# Patient Record
Sex: MALE | Race: WHITE | HISPANIC OR LATINO | Employment: OTHER | URBAN - METROPOLITAN AREA
[De-identification: names, ages, dates, MRNs, and addresses within clinical notes are randomized per-mention and may not be internally consistent; named-entity substitution may affect disease eponyms.]

---

## 2017-02-09 ENCOUNTER — OFFICE VISIT (OUTPATIENT)
Dept: FAMILY MEDICINE | Facility: CLINIC | Age: 62
End: 2017-02-09
Payer: MEDICAID

## 2017-02-09 VITALS
SYSTOLIC BLOOD PRESSURE: 142 MMHG | HEIGHT: 62 IN | HEART RATE: 68 BPM | BODY MASS INDEX: 31.73 KG/M2 | TEMPERATURE: 97.7 F | WEIGHT: 172.4 LBS | DIASTOLIC BLOOD PRESSURE: 82 MMHG

## 2017-02-09 DIAGNOSIS — Z12.5 SCREENING FOR PROSTATE CANCER: ICD-10-CM

## 2017-02-09 DIAGNOSIS — R15.9 FECAL INCONTINENCE: ICD-10-CM

## 2017-02-09 DIAGNOSIS — I10 BENIGN ESSENTIAL HYPERTENSION: ICD-10-CM

## 2017-02-09 DIAGNOSIS — Z11.59 NEED FOR HEPATITIS C SCREENING TEST: Primary | ICD-10-CM

## 2017-02-09 DIAGNOSIS — D64.9 ANEMIA, UNSPECIFIED TYPE: ICD-10-CM

## 2017-02-09 DIAGNOSIS — Z00.00 ROUTINE GENERAL MEDICAL EXAMINATION AT A HEALTH CARE FACILITY: ICD-10-CM

## 2017-02-09 DIAGNOSIS — Z12.11 COLON CANCER SCREENING: ICD-10-CM

## 2017-02-09 DIAGNOSIS — Z13.220 LIPID SCREENING: ICD-10-CM

## 2017-02-09 PROBLEM — I48.0 PAF (PAROXYSMAL ATRIAL FIBRILLATION) (H): Status: ACTIVE | Noted: 2017-02-09

## 2017-02-09 LAB
ALBUMIN SERPL-MCNC: 3.9 G/DL (ref 3.4–5)
ALP SERPL-CCNC: 53 U/L (ref 40–150)
ALT SERPL W P-5'-P-CCNC: 23 U/L (ref 0–70)
ANION GAP SERPL CALCULATED.3IONS-SCNC: 6 MMOL/L (ref 3–14)
AST SERPL W P-5'-P-CCNC: 16 U/L (ref 0–45)
BASOPHILS # BLD AUTO: 0 10E9/L (ref 0–0.2)
BASOPHILS NFR BLD AUTO: 0.4 %
BILIRUB SERPL-MCNC: 0.3 MG/DL (ref 0.2–1.3)
BUN SERPL-MCNC: 18 MG/DL (ref 7–30)
CALCIUM SERPL-MCNC: 8.6 MG/DL (ref 8.5–10.1)
CHLORIDE SERPL-SCNC: 105 MMOL/L (ref 94–109)
CO2 SERPL-SCNC: 27 MMOL/L (ref 20–32)
CREAT SERPL-MCNC: 0.98 MG/DL (ref 0.66–1.25)
DIFFERENTIAL METHOD BLD: NORMAL
EOSINOPHIL # BLD AUTO: 0.2 10E9/L (ref 0–0.7)
EOSINOPHIL NFR BLD AUTO: 2.4 %
ERYTHROCYTE [DISTWIDTH] IN BLOOD BY AUTOMATED COUNT: 12.7 % (ref 10–15)
GFR SERPL CREATININE-BSD FRML MDRD: 77 ML/MIN/1.7M2
GLUCOSE SERPL-MCNC: 84 MG/DL (ref 70–99)
HCT VFR BLD AUTO: 42.7 % (ref 40–53)
HGB BLD-MCNC: 14.3 G/DL (ref 13.3–17.7)
LYMPHOCYTES # BLD AUTO: 2.3 10E9/L (ref 0.8–5.3)
LYMPHOCYTES NFR BLD AUTO: 31.9 %
MCH RBC QN AUTO: 31.4 PG (ref 26.5–33)
MCHC RBC AUTO-ENTMCNC: 33.5 G/DL (ref 31.5–36.5)
MCV RBC AUTO: 94 FL (ref 78–100)
MONOCYTES # BLD AUTO: 0.5 10E9/L (ref 0–1.3)
MONOCYTES NFR BLD AUTO: 7.5 %
NEUTROPHILS # BLD AUTO: 4.1 10E9/L (ref 1.6–8.3)
NEUTROPHILS NFR BLD AUTO: 57.8 %
PLATELET # BLD AUTO: 264 10E9/L (ref 150–450)
POTASSIUM SERPL-SCNC: 3.6 MMOL/L (ref 3.4–5.3)
PROT SERPL-MCNC: 7.4 G/DL (ref 6.8–8.8)
RBC # BLD AUTO: 4.56 10E12/L (ref 4.4–5.9)
SODIUM SERPL-SCNC: 138 MMOL/L (ref 133–144)
WBC # BLD AUTO: 7.1 10E9/L (ref 4–11)

## 2017-02-09 PROCEDURE — 80053 COMPREHEN METABOLIC PANEL: CPT | Performed by: NURSE PRACTITIONER

## 2017-02-09 PROCEDURE — 86803 HEPATITIS C AB TEST: CPT | Performed by: NURSE PRACTITIONER

## 2017-02-09 PROCEDURE — 36415 COLL VENOUS BLD VENIPUNCTURE: CPT | Performed by: NURSE PRACTITIONER

## 2017-02-09 PROCEDURE — 99213 OFFICE O/P EST LOW 20 MIN: CPT | Mod: 25 | Performed by: NURSE PRACTITIONER

## 2017-02-09 PROCEDURE — 85025 COMPLETE CBC W/AUTO DIFF WBC: CPT | Performed by: NURSE PRACTITIONER

## 2017-02-09 PROCEDURE — 99386 PREV VISIT NEW AGE 40-64: CPT | Performed by: NURSE PRACTITIONER

## 2017-02-09 RX ORDER — HYDROCHLOROTHIAZIDE 25 MG/1
25 TABLET ORAL DAILY
Qty: 90 TABLET | Refills: 0 | Status: SHIPPED | OUTPATIENT
Start: 2017-02-09 | End: 2017-06-12

## 2017-02-09 ASSESSMENT — ENCOUNTER SYMPTOMS
FATIGUE: 0
BLOOD IN STOOL: 1
COUGH: 0
DIARRHEA: 1
WHEEZING: 0
CONSTIPATION: 0
LIGHT-HEADEDNESS: 0
PALPITATIONS: 0
NUMBNESS: 1
ARTHRALGIAS: 1
FREQUENCY: 0
SORE THROAT: 0
SINUS PRESSURE: 0
JOINT SWELLING: 0
BACK PAIN: 1
ABDOMINAL PAIN: 0
RHINORRHEA: 0
SHORTNESS OF BREATH: 0
DIZZINESS: 0
DYSURIA: 0
HEADACHES: 1

## 2017-02-09 NOTE — LETTER
[facility]  [address] [address]  Phone: [phone]    February 14, 2017    Odilon Perea  [address] [address]              Dear Huey Perea,    Your blood test for Hepatitis C was inconclusive. Please return to the clinic for a lab appointment and will redraw this.  If you have any further questions please call the clinic at [phone].        Sincerely,      Laila VIVEROS / [facility]

## 2017-02-09 NOTE — MR AVS SNAPSHOT
After Visit Summary   2/9/2017    Odilon Perea    MRN: [id number]           Patient Information     Date Of Birth          1955        Visit Information        Provider Department      2/9/2017 1:30 PM Laila Back APRN CNP; [place] [employer] [facility]        Today's Diagnoses     Need for hepatitis C screening test    -  1     Colon cancer screening         Lipid screening         Screening         Routine general medical examination at a health care facility         Benign essential hypertension         Anemia, unspecified type         Screening for prostate cancer           Care Instructions    Make return appointment for fasting labs when you have not had anything to eat for 8 hours prior and the only thing to drink is water    Stop taking vasdecom, westepiron, Acetilsalicilico, sulfato ferroso, acido folico, hioscina, navaxen    You can continue to take the hidroclorotiazida, paracetamol and the nibezvag.       Preventive Health Recommendations  Male Ages 50 - 64    Yearly exam:             See your health care provider every year in order to  o   Review health changes.   o   Discuss preventive care.    o   Review your medicines if your doctor has prescribed any.     Have a cholesterol test every 5 years, or more frequently if you are at risk for high cholesterol/heart disease.     Have a diabetes test (fasting glucose) every three years. If you are at risk for diabetes, you should have this test more often.     Have a colonoscopy at age 50, or have a yearly FIT test (stool test). These exams will check for colon cancer.      Talk with your health care provider about whether or not a prostate cancer screening test (PSA) is right for you.    You should be tested each year for STDs (sexually transmitted diseases), if you re at risk.     Shots: Get a flu shot each year. Get a tetanus shot every 10 years.     Nutrition:    Eat at least 5 servings of  fruits and vegetables daily.     Eat whole-grain bread, whole-wheat pasta and brown rice instead of white grains and rice.     Talk to your provider about Calcium and Vitamin D.     Lifestyle    Exercise for at least 150 minutes a week (30 minutes a day, 5 days a week). This will help you control your weight and prevent disease.     Limit alcohol to one drink per day.     No smoking.     Wear sunscreen to prevent skin cancer.     See your dentist every six months for an exam and cleaning.     See your eye doctor every 1 to 2 years.            Follow-ups after your visit        Additional Services     GASTROENTEROLOGY ADULT REF PROCEDURE ONLY       Last Lab Result: No results found for: CR  There is no height or weight on file to calculate BMI.     Needed:  Yes  Language:  Hungarian    Patient will be contacted to schedule procedure.     Please be aware that coverage of these services is subject to the terms and limitations of your health insurance plan.  Call member services at your health plan with any benefit or coverage questions.  Any procedures must be performed at a [facility] facility OR coordinated by your clinic's referral office.    Please bring the following with you to your appointment:    (1) Any X-Rays, CTs or MRIs which have been performed.  Contact the facility where they were done to arrange for  prior to your scheduled appointment.    (2) List of current medications   (3) This referral request   (4) Any documents/labs given to you for this referral                  Future tests that were ordered for you today     Open Future Orders        Priority Expected Expires Ordered    Prostate spec antigen screen Routine  2/9/2018 2/9/2017    TSH with free T4 reflex Routine  2/9/2018 2/9/2017    Iron Routine  2/9/2018 2/9/2017    Iron and iron binding capacity Routine  2/9/2018 2/9/2017    Albumin Random Urine Quantitative Routine  2/9/2018 2/9/2017    UA reflex to Microscopic and Culture  "Routine  2018    Lipid panel reflex to direct LDL Routine  2018    Folate Routine  2018            Who to contact     Normal or non-critical lab and imaging results will be communicated to you by tab ticketbrokerhart, letter or phone within 4 business days after the clinic has received the results. If you do not hear from us within 7 days, please contact the clinic through MyChart or phone. If you have a critical or abnormal lab result, we will notify you by phone as soon as possible.  Submit refill requests through [employer] or call your pharmacy and they will forward the refill request to us. Please allow 3 business days for your refill to be completed.          If you need to speak with a  for additional information , please call: [phone]           Additional Information About Your Visit        [employer] Information     [employer]ji lets you send messages to your doctor, view your test results, renew your prescriptions, schedule appointments and more. To sign up, go to [email or web address] . Click on \"Log in\" on the left side of the screen, which will take you to the Welcome page. Then click on \"Sign up Now\" on the right side of the page.     You will be asked to enter the access code listed below, as well as some personal information. Please follow the directions to create your username and password.     Your access code is: [email or web address]  Expires: 5/10/2017  2:51 PM     Your access code will  in 90 days. If you need help or a new code, please call your [place] clinic or [phone].        Care EveryWhere ID     This is your Care EveryWhere ID. This could be used by other organizations to access your [facility] [place] medical records  [id number]        Your Vitals Were     Pulse Temperature Height BMI (Body Mass Index)          68 97.7  F (36.5  C) (Tympanic) 5' 2.25\" (1.581 m) 31.29 kg/m2         Blood Pressure from Last 3 Encounters:   17 142/82    Weight from " Last 3 Encounters:   02/09/17 172 lb 6.4 oz (78.2 kg)              We Performed the Following     CBC with platelets differential     Comprehensive metabolic panel     GASTROENTEROLOGY ADULT REF PROCEDURE ONLY     Hepatitis C antibody        Primary Care Provider    None Specified       No primary provider on file.        Thank you!     Thank you for choosing [facility]  for your care. Our goal is always to provide you with excellent care. Hearing back from our patients is one way we can continue to improve our services. Please take a few minutes to complete the written survey that you may receive in the mail after your visit with us. Thank you!             Your Updated Medication List - Protect others around you: Learn how to safely use, store and throw away your medicines at www.disposemymeds.org.          This list is accurate as of: 2/9/17  2:51 PM.  Always use your most recent med list.                   Brand Name Dispense Instructions for use    IRBESARTAN PO      Take 300 mg by mouth       OMEPRAZOLE PO      Take 20 mg by mouth       * UNABLE TO FIND      MEDICATION NAME:  Westepiron 500 mg       * UNABLE TO FIND      MEDICATION NAME: Vasdecom 120 mg       * UNABLE TO FIND      MEDICATION NAME: Acido Acetilsalicilico 500 mg       * UNABLE TO FIND      MEDICATION NAME: sulfato ferroso       * UNABLE TO FIND      MEDICATION NAME: acido folico 0.4 mg       * UNABLE TO FIND      MEDICATION NAME: Salbutamol 100 ug       * UNABLE TO FIND      MEDICATION NAME: hioscina 10 mg       * UNABLE TO FIND      MEDICATION NAME: novaxen 250 mg       * UNABLE TO FIND      MEDICATION NAME: hidroclorotiazida 25 mg       * UNABLE TO FIND      MEDICATION NAME: nibezvag 200 mg       * UNABLE TO FIND      MEDICATION NAME: paracetamol 500 mg       * Notice:  This list has 11 medication(s) that are the same as other medications prescribed for you. Read the directions carefully, and ask your doctor or other care  provider to review them with you.

## 2017-02-09 NOTE — PATIENT INSTRUCTIONS
Make return appointment for fasting labs when you have not had anything to eat for 8 hours prior and the only thing to drink is water    Stop taking vasdecom, westepiron, Acetilsalicilico, sulfato ferroso, acido folico, hioscina, navaxen    You can continue to take the hidroclorotiazida, paracetamol and the nibezvag.       Preventive Health Recommendations  Male Ages 50   64    Yearly exam:             See your health care provider every year in order to  o   Review health changes.   o   Discuss preventive care.    o   Review your medicines if your doctor has prescribed any.     Have a cholesterol test every 5 years, or more frequently if you are at risk for high cholesterol/heart disease.     Have a diabetes test (fasting glucose) every three years. If you are at risk for diabetes, you should have this test more often.     Have a colonoscopy at age 50, or have a yearly FIT test (stool test). These exams will check for colon cancer.      Talk with your health care provider about whether or not a prostate cancer screening test (PSA) is right for you.    You should be tested each year for STDs (sexually transmitted diseases), if you re at risk.     Shots: Get a flu shot each year. Get a tetanus shot every 10 years.     Nutrition:    Eat at least 5 servings of fruits and vegetables daily.     Eat whole-grain bread, whole-wheat pasta and brown rice instead of white grains and rice.     Talk to your provider about Calcium and Vitamin D.     Lifestyle    Exercise for at least 150 minutes a week (30 minutes a day, 5 days a week). This will help you control your weight and prevent disease.     Limit alcohol to one drink per day.     No smoking.     Wear sunscreen to prevent skin cancer.     See your dentist every six months for an exam and cleaning.     See your eye doctor every 1 to 2 years.
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
## 2017-02-09 NOTE — NURSING NOTE
"Chief Complaint   Patient presents with     Physical       Initial /82 mmHg  Pulse 68  Temp(Src) 97.7  F (36.5  C) (Tympanic)  Ht 5' 2.25\" (1.581 m)  Wt 172 lb 6.4 oz (78.2 kg)  BMI 31.29 kg/m2 Estimated body mass index is 31.29 kg/(m^2) as calculated from the following:    Height as of this encounter: 5' 2.25\" (1.581 m).    Weight as of this encounter: 172 lb 6.4 oz (78.2 kg).  Medication Reconciliation: complete    "

## 2017-02-10 DIAGNOSIS — Z11.59 NEED FOR HEPATITIS C SCREENING TEST: Primary | ICD-10-CM

## 2017-02-10 LAB — HCV AB SERPL QL IA: ABNORMAL

## 2017-02-14 ENCOUNTER — HOSPITAL (OUTPATIENT)
Dept: OTHER | Age: 62
End: 2017-02-14
Attending: INTERNAL MEDICINE

## 2017-02-16 ENCOUNTER — ALLIED HEALTH/NURSE VISIT (OUTPATIENT)
Dept: FAMILY MEDICINE | Facility: CLINIC | Age: 62
End: 2017-02-16
Payer: MEDICAID

## 2017-02-16 VITALS — SYSTOLIC BLOOD PRESSURE: 124 MMHG | DIASTOLIC BLOOD PRESSURE: 86 MMHG | HEART RATE: 72 BPM

## 2017-02-16 DIAGNOSIS — Z00.00 ROUTINE GENERAL MEDICAL EXAMINATION AT A HEALTH CARE FACILITY: ICD-10-CM

## 2017-02-16 DIAGNOSIS — I10 BENIGN ESSENTIAL HYPERTENSION: ICD-10-CM

## 2017-02-16 DIAGNOSIS — Z12.5 SCREENING FOR PROSTATE CANCER: ICD-10-CM

## 2017-02-16 DIAGNOSIS — Z11.59 NEED FOR HEPATITIS C SCREENING TEST: ICD-10-CM

## 2017-02-16 DIAGNOSIS — D64.9 ANEMIA, UNSPECIFIED TYPE: ICD-10-CM

## 2017-02-16 DIAGNOSIS — Z01.30 BP CHECK: Primary | ICD-10-CM

## 2017-02-16 DIAGNOSIS — M25.50 MULTIPLE JOINT PAIN: Primary | ICD-10-CM

## 2017-02-16 DIAGNOSIS — Z13.220 LIPID SCREENING: ICD-10-CM

## 2017-02-16 LAB
ALBUMIN UR-MCNC: NEGATIVE MG/DL
APPEARANCE UR: CLEAR
BILIRUB UR QL STRIP: NEGATIVE
CHOLEST SERPL-MCNC: 145 MG/DL
COLOR UR AUTO: YELLOW
CREAT UR-MCNC: 110 MG/DL
FOLATE SERPL-MCNC: 13.9 NG/ML
GLUCOSE UR STRIP-MCNC: NEGATIVE MG/DL
HDLC SERPL-MCNC: 39 MG/DL
HGB UR QL STRIP: ABNORMAL
IRON SATN MFR SERPL: 22 % (ref 15–46)
IRON SERPL-MCNC: 75 UG/DL (ref 35–180)
KETONES UR STRIP-MCNC: NEGATIVE MG/DL
LDLC SERPL CALC-MCNC: 81 MG/DL
LEUKOCYTE ESTERASE UR QL STRIP: NEGATIVE
MICROALBUMIN UR-MCNC: 5 MG/L
MICROALBUMIN/CREAT UR: 4.55 MG/G CR (ref 0–17)
NITRATE UR QL: NEGATIVE
NONHDLC SERPL-MCNC: 106 MG/DL
PH UR STRIP: 7.5 PH (ref 5–7)
PSA SERPL-ACNC: 1.91 UG/L (ref 0–4)
RBC #/AREA URNS AUTO: NORMAL /HPF (ref 0–2)
SP GR UR STRIP: 1.01 (ref 1–1.03)
TIBC SERPL-MCNC: 346 UG/DL (ref 240–430)
TRIGL SERPL-MCNC: 123 MG/DL
TSH SERPL DL<=0.005 MIU/L-ACNC: 1.68 MU/L (ref 0.4–4)
URN SPEC COLLECT METH UR: ABNORMAL
UROBILINOGEN UR STRIP-ACNC: 0.2 EU/DL (ref 0.2–1)
WBC #/AREA URNS AUTO: NORMAL /HPF (ref 0–2)

## 2017-02-16 PROCEDURE — 99207 ZZC NO CHARGE NURSE ONLY: CPT

## 2017-02-16 PROCEDURE — 82043 UR ALBUMIN QUANTITATIVE: CPT | Performed by: NURSE PRACTITIONER

## 2017-02-16 PROCEDURE — 84443 ASSAY THYROID STIM HORMONE: CPT | Performed by: NURSE PRACTITIONER

## 2017-02-16 PROCEDURE — 83540 ASSAY OF IRON: CPT | Performed by: NURSE PRACTITIONER

## 2017-02-16 PROCEDURE — 80061 LIPID PANEL: CPT | Performed by: NURSE PRACTITIONER

## 2017-02-16 PROCEDURE — 81001 URINALYSIS AUTO W/SCOPE: CPT | Performed by: NURSE PRACTITIONER

## 2017-02-16 PROCEDURE — 83550 IRON BINDING TEST: CPT | Performed by: NURSE PRACTITIONER

## 2017-02-16 PROCEDURE — G0103 PSA SCREENING: HCPCS | Performed by: NURSE PRACTITIONER

## 2017-02-16 PROCEDURE — 86803 HEPATITIS C AB TEST: CPT | Performed by: NURSE PRACTITIONER

## 2017-02-16 PROCEDURE — 82746 ASSAY OF FOLIC ACID SERUM: CPT | Performed by: NURSE PRACTITIONER

## 2017-02-16 PROCEDURE — 36415 COLL VENOUS BLD VENIPUNCTURE: CPT | Performed by: NURSE PRACTITIONER

## 2017-02-16 RX ORDER — NAPROXEN 500 MG/1
500 TABLET ORAL 2 TIMES DAILY WITH MEALS
Qty: 180 TABLET | Refills: 0 | Status: SHIPPED | OUTPATIENT
Start: 2017-02-16 | End: 2017-06-12

## 2017-02-16 NOTE — MR AVS SNAPSHOT
"              After Visit Summary   2017    Odilon Perea    MRN: [id number]           Patient Information     Date Of Birth          1955        Visit Information        Provider Department      2017 8:30 AM Norma/Miguel A Regan [facility]        Today's Diagnoses     BP check    -  1       Follow-ups after your visit        Who to contact     Normal or non-critical lab and imaging results will be communicated to you by MyChart, letter or phone within 4 business days after the clinic has received the results. If you do not hear from us within 7 days, please contact the clinic through MyChart or phone. If you have a critical or abnormal lab result, we will notify you by phone as soon as possible.  Submit refill requests through [employer] or call your pharmacy and they will forward the refill request to us. Please allow 3 business days for your refill to be completed.          If you need to speak with a  for additional information , please call: [phone]           Additional Information About Your Visit        [employer] Information     [employer] lets you send messages to your doctor, view your test results, renew your prescriptions, schedule appointments and more. To sign up, go to [email or web address] . Click on \"Log in\" on the left side of the screen, which will take you to the Welcome page. Then click on \"Sign up Now\" on the right side of the page.     You will be asked to enter the access code listed below, as well as some personal information. Please follow the directions to create your username and password.     Your access code is: [email or web address]  Expires: 5/10/2017  2:51 PM     Your access code will  in 90 days. If you need help or a new code, please call your [facility] clinic or [phone].        Care EveryWhere ID     This is your Care EveryWhere ID. This could be used by other organizations to access your [facility] medical records  [id number]        Your " Vitals Were     Pulse                   72            Blood Pressure from Last 3 Encounters:   02/16/17 124/86   02/09/17 142/82    Weight from Last 3 Encounters:   02/09/17 172 lb 6.4 oz (78.2 kg)              Today, you had the following     No orders found for display       Primary Care Provider Office Phone # Fax #    JACKELINE Diez [phone]       [facility] [address]   [place] [address]        Thank you!     Thank you for choosing [facility]  for your care. Our goal is always to provide you with excellent care. Hearing back from our patients is one way we can continue to improve our services. Please take a few minutes to complete the written survey that you may receive in the mail after your visit with us. Thank you!             Your Updated Medication List - Protect others around you: Learn how to safely use, store and throw away your medicines at www.disposemymeds.org.          This list is accurate as of: 2/16/17  8:35 AM.  Always use your most recent med list.                   Brand Name Dispense Instructions for use    hydrochlorothiazide 25 MG tablet    HYDRODIURIL    90 tablet    Take 1 tablet (25 mg) by mouth daily       IRBESARTAN PO      Take 300 mg by mouth       OMEPRAZOLE PO      Take 20 mg by mouth       * UNABLE TO FIND      MEDICATION NAME:  Westepiron 500 mg       * UNABLE TO FIND      MEDICATION NAME: Vasdecom 120 mg       * UNABLE TO FIND      MEDICATION NAME: Acido Acetilsalicilico 500 mg       * UNABLE TO FIND      MEDICATION NAME: sulfato ferroso       * UNABLE TO FIND      MEDICATION NAME: acido folico 0.4 mg       * UNABLE TO FIND      MEDICATION NAME: Salbutamol 100 ug       * UNABLE TO FIND      MEDICATION NAME: hioscina 10 mg       * UNABLE TO FIND      MEDICATION NAME: novaxen 250 mg       * UNABLE TO FIND      MEDICATION NAME: hidroclorotiazida 25 mg       * UNABLE TO FIND      MEDICATION NAME: nibezvag 200  mg       * UNABLE TO FIND      MEDICATION NAME: paracetamol 500 mg       * Notice:  This list has 11 medication(s) that are the same as other medications prescribed for you. Read the directions carefully, and ask your doctor or other care provider to review them with you.

## 2017-02-16 NOTE — PROGRESS NOTES
SUBJECTIVE:  Odilon Perea is a 62 year old male who presents for a follow up evaluation of his hypertension.    The reason for the visit is:  Headaches, nausea, and dizziness. His legs and hands are also swollen. He has been having hip and leg pain at night. He would like his blood pressure checked today.     Patient is taking medication as prescribed  Patient is tolerating medications well.  Patient is not monitoring Blood Pressure at home.  Average readings if yes are NA    Current complaints: headaches, nausea and dizziness, swelling in legs and hands. Hip and leg pain at night.      Current Outpatient Prescriptions   Medication     IRBESARTAN PO     UNABLE TO FIND     UNABLE TO FIND     UNABLE TO FIND     UNABLE TO FIND     UNABLE TO FIND     UNABLE TO FIND     UNABLE TO FIND     UNABLE TO FIND     UNABLE TO FIND     OMEPRAZOLE PO     UNABLE TO FIND     UNABLE TO FIND     hydrochlorothiazide (HYDRODIURIL) 25 MG tablet     No current facility-administered medications for this visit.        No Known Allergies      OBJECTIVE:  Please get a blood pressure AND a pulse.  A height is also needed if has not been done in the past year.    /86 (BP Location: Left arm, Patient Position: Chair, Cuff Size: Adult Regular)  Pulse 72    Vitals as recorded, a regular cuff was used.    ASSESSMENT:    Is the HYPERTENSION goal on the problem list? No  Patient Active Problem List   Diagnosis     PAF (paroxysmal atrial fibrillation) (H)       Plan:  The patient's blood pressure is less than documented goal. The patient will be discharged home. CC: this note to the patient's primary provider.     The patient s blood pressure is higher than goal but is less than 180 systolically AND less that 110 diastolically. The patient will be discharged home.  A telephone encounter will be created with this note and sent to the patient's primary provider for action.     The patient's blood pressure is above 180 systolically OR is above 110  diastolically. The primary provider, RN, or an available provider will be consulted for immediate action. Keep the patient here for appropriate urgent action.

## 2017-02-17 DIAGNOSIS — Z11.59 NEED FOR HEPATITIS C SCREENING TEST: Primary | ICD-10-CM

## 2017-02-17 LAB — HCV AB SERPL QL IA: ABNORMAL

## 2017-02-21 ENCOUNTER — MEDICAL CORRESPONDENCE (OUTPATIENT)
Dept: HEALTH INFORMATION MANAGEMENT | Facility: CLINIC | Age: 62
End: 2017-02-21

## 2017-02-23 ENCOUNTER — OFFICE VISIT (OUTPATIENT)
Dept: FAMILY MEDICINE | Facility: CLINIC | Age: 62
End: 2017-02-23
Payer: MEDICAID

## 2017-02-23 VITALS
TEMPERATURE: 98.1 F | HEART RATE: 77 BPM | SYSTOLIC BLOOD PRESSURE: 116 MMHG | BODY MASS INDEX: 30.7 KG/M2 | OXYGEN SATURATION: 99 % | WEIGHT: 169.2 LBS | DIASTOLIC BLOOD PRESSURE: 80 MMHG

## 2017-02-23 DIAGNOSIS — M16.10 ARTHRITIS OF HIP: ICD-10-CM

## 2017-02-23 DIAGNOSIS — R19.7 DIARRHEA, UNSPECIFIED TYPE: ICD-10-CM

## 2017-02-23 DIAGNOSIS — R15.9 BOWEL INCONTINENCE: ICD-10-CM

## 2017-02-23 DIAGNOSIS — I10 ESSENTIAL HYPERTENSION: Primary | ICD-10-CM

## 2017-02-23 DIAGNOSIS — Z11.59 NEED FOR HEPATITIS C SCREENING TEST: ICD-10-CM

## 2017-02-23 DIAGNOSIS — K21.00 GASTROESOPHAGEAL REFLUX DISEASE WITH ESOPHAGITIS: ICD-10-CM

## 2017-02-23 DIAGNOSIS — I48.0 PAF (PAROXYSMAL ATRIAL FIBRILLATION) (H): ICD-10-CM

## 2017-02-23 PROCEDURE — 36415 COLL VENOUS BLD VENIPUNCTURE: CPT | Performed by: NURSE PRACTITIONER

## 2017-02-23 PROCEDURE — 86803 HEPATITIS C AB TEST: CPT | Performed by: NURSE PRACTITIONER

## 2017-02-23 PROCEDURE — 99215 OFFICE O/P EST HI 40 MIN: CPT | Performed by: NURSE PRACTITIONER

## 2017-02-23 ASSESSMENT — ENCOUNTER SYMPTOMS
SORE THROAT: 0
DIZZINESS: 0
DIARRHEA: 1
LIGHT-HEADEDNESS: 0
SINUS PRESSURE: 0
WHEEZING: 0
JOINT SWELLING: 1
FREQUENCY: 0
SHORTNESS OF BREATH: 0
PALPITATIONS: 0
CONSTIPATION: 0
HEADACHES: 1
RHINORRHEA: 0
FATIGUE: 0
DYSURIA: 0
NUMBNESS: 0
ARTHRALGIAS: 1
COUGH: 0
ABDOMINAL PAIN: 0

## 2017-02-23 NOTE — NURSING NOTE
"Chief Complaint   Patient presents with     Rectal Problem     rectal leakage       Initial /80 (BP Location: Left arm, Patient Position: Chair, Cuff Size: Adult Regular)  Pulse 77  Temp 98.1  F (36.7  C) (Tympanic)  Wt 169 lb 3.2 oz (76.7 kg)  SpO2 99%  BMI 30.7 kg/m2 Estimated body mass index is 30.7 kg/(m^2) as calculated from the following:    Height as of 2/9/17: 5' 2.25\" (1.581 m).    Weight as of this encounter: 169 lb 3.2 oz (76.7 kg).  Medication Reconciliation: complete     April JED Simons      "
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
## 2017-02-23 NOTE — MR AVS SNAPSHOT
After Visit Summary   2/23/2017    Odilon Perea    MRN: [id number]           Patient Information     Date Of Birth          1955        Visit Information        Provider Department      2/23/2017 12:45 PM Laila Back APRN CNP; [employer] [employer] [facility]        Today's Diagnoses     Essential hypertension    -  1    PAF (paroxysmal atrial fibrillation) (H)        Gastroesophageal reflux disease with esophagitis        Diarrhea, unspecified type        Bowel incontinence           Follow-ups after your visit        Additional Services     CARDIOLOGY EVAL ADULT REFERRAL       Your provider has referred you to:  [facility] [phone]   [email or web address]    Please be aware that coverage of these services is subject to the terms and limitations of your health insurance plan.  Call member services at your health plan with any benefit or coverage questions.      Type of Referral:  New Cardiology Consult    Timeframe requested:  Within 1 month    Please bring the following to your appointment:  >>   Any x-rays, CTs or MRIs which have been performed.  Contact the facility where they were done to arrange for  prior to your scheduled appointment.    >>   List of current medications  >>   This referral request   >>   Any documents/labs given to you for this referral            GASTROENTEROLOGY ADULT REF PROCEDURE ONLY       Last Lab Result: Creatinine (mg/dL)       Date                     Value                 02/09/2017               0.98             ----------  Body mass index is 30.7 kg/(m^2).     Needed:  Yes  Language:  Norwegian    Patient will be contacted to schedule procedure.     Please be aware that coverage of these services is subject to the terms and limitations of your health insurance plan.  Call member services at your health plan with any  "benefit or coverage questions.  Any procedures must be performed at a [facility] facility OR coordinated by your clinic's referral office.    Please bring the following with you to your appointment:    (1) Any X-Rays, CTs or MRIs which have been performed.  Contact the facility where they were done to arrange for  prior to your scheduled appointment.    (2) List of current medications   (3) This referral request   (4) Any documents/labs given to you for this referral                  Who to contact     Normal or non-critical lab and imaging results will be communicated to you by [employer], letter or phone within 4 business days after the clinic has received the results. If you do not hear from us within 7 days, please contact the clinic through [employer] or phone. If you have a critical or abnormal lab result, we will notify you by phone as soon as possible.  Submit refill requests through [employer] or call your pharmacy and they will forward the refill request to us. Please allow 3 business days for your refill to be completed.          If you need to speak with a  for additional information , please call: [phone]           Additional Information About Your Visit        [employer] Information     [employer] lets you send messages to your doctor, view your test results, renew your prescriptions, schedule appointments and more. To sign up, go to [email or web address] . Click on \"Log in\" on the left side of the screen, which will take you to the Welcome page. Then click on \"Sign up Now\" on the right side of the page.     You will be asked to enter the access code listed below, as well as some personal information. Please follow the directions to create your username and password.     Your access code is: [email or web address]  Expires: 5/10/2017  2:51 PM     Your access code will  in 90 days. If you need help or a new code, please call your [facility] clinic or [phone].        Care EveryWhere ID     " This is your Care EveryWhere ID. This could be used by other organizations to access your [facility] medical records  [id number]        Your Vitals Were     Pulse Temperature Pulse Oximetry BMI (Body Mass Index)          77 98.1  F (36.7  C) (Tympanic) 99% 30.7 kg/m2         Blood Pressure from Last 3 Encounters:   02/23/17 116/80   02/16/17 124/86   02/09/17 142/82    Weight from Last 3 Encounters:   02/23/17 169 lb 3.2 oz (76.7 kg)   02/09/17 172 lb 6.4 oz (78.2 kg)              We Performed the Following     CARDIOLOGY EVAL ADULT REFERRAL     GASTROENTEROLOGY ADULT REF PROCEDURE ONLY          Today's Medication Changes          These changes are accurate as of: 2/23/17  1:42 PM.  If you have any questions, ask your nurse or doctor.               These medicines have changed or have updated prescriptions.        Dose/Directions    * OMEPRAZOLE PO   This may have changed:  Another medication with the same name was added. Make sure you understand how and when to take each.   Changed by:  Laila Back APRN CNP        Dose:  20 mg   Take 20 mg by mouth   Refills:  0       * omeprazole 20 MG CR capsule   Commonly known as:  priLOSEC   This may have changed:  You were already taking a medication with the same name, and this prescription was added. Make sure you understand how and when to take each.   Used for:  Gastroesophageal reflux disease with esophagitis   Changed by:  Laila Back APRN CNP        Dose:  20 mg   Take 1 capsule (20 mg) by mouth daily   Quantity:  90 capsule   Refills:  1       * Notice:  This list has 2 medication(s) that are the same as other medications prescribed for you. Read the directions carefully, and ask your doctor or other care provider to review them with you.         Where to get your medicines      These medications were sent to [facility] [address]  [address], [facility] [address]     Phone:  [phone]      omeprazole 20 MG CR capsule                Primary Care Provider Office Phone # Fax #    JACKELINE Diez [phone]       [facility] [address] [place] [address]        Thank you!     Thank you for choosing [facility]  for your care. Our goal is always to provide you with excellent care. Hearing back from our patients is one way we can continue to improve our services. Please take a few minutes to complete the written survey that you may receive in the mail after your visit with us. Thank you!             Your Updated Medication List - Protect others around you: Learn how to safely use, store and throw away your medicines at www.disposemymeds.org.          This list is accurate as of: 2/23/17  1:42 PM.  Always use your most recent med list.                   Brand Name Dispense Instructions for use    hydrochlorothiazide 25 MG tablet    HYDRODIURIL    90 tablet    Take 1 tablet (25 mg) by mouth daily       IRBESARTAN PO      Take 300 mg by mouth       naproxen 500 MG tablet    NAPROSYN    180 tablet    Take 1 tablet (500 mg) by mouth 2 times daily (with meals)       * OMEPRAZOLE PO      Take 20 mg by mouth       * omeprazole 20 MG CR capsule    priLOSEC    90 capsule    Take 1 capsule (20 mg) by mouth daily       * UNABLE TO FIND      MEDICATION NAME:  Westepiron 500 mg       * UNABLE TO FIND      MEDICATION NAME: Vasdecom 120 mg       * UNABLE TO FIND      MEDICATION NAME: Acido Acetilsalicilico 500 mg       * UNABLE TO FIND      MEDICATION NAME: sulfato ferroso       * UNABLE TO FIND      MEDICATION NAME: acido folico 0.4 mg       * UNABLE TO FIND      MEDICATION NAME: Salbutamol 100 ug       * UNABLE TO FIND      MEDICATION NAME: hioscina 10 mg       * UNABLE TO FIND      MEDICATION NAME: novaxen 250 mg       * UNABLE TO FIND      MEDICATION NAME: hidroclorotiazida 25 mg       * UNABLE TO FIND      MEDICATION NAME: nibezvag 200 mg       *  UNABLE TO FIND      MEDICATION NAME: paracetamol 500 mg       * Notice:  This list has 13 medication(s) that are the same as other medications prescribed for you. Read the directions carefully, and ask your doctor or other care provider to review them with you.

## 2017-02-24 LAB — HCV AB SERPL QL IA: ABNORMAL

## 2017-02-27 DIAGNOSIS — Z11.59 NEED FOR HEPATITIS C SCREENING TEST: Primary | ICD-10-CM

## 2017-03-06 DIAGNOSIS — Z11.59 NEED FOR HEPATITIS C SCREENING TEST: ICD-10-CM

## 2017-03-06 PROCEDURE — 36415 COLL VENOUS BLD VENIPUNCTURE: CPT | Performed by: NURSE PRACTITIONER

## 2017-03-06 PROCEDURE — 87522 HEPATITIS C REVRS TRNSCRPJ: CPT | Performed by: NURSE PRACTITIONER

## 2017-03-09 LAB
HCV RNA SERPL NAA+PROBE-ACNC: NORMAL [IU]/ML
HCV RNA SERPL NAA+PROBE-LOG IU: NORMAL LOG IU/ML

## 2017-03-14 ENCOUNTER — HOSPITAL ENCOUNTER (OUTPATIENT)
Dept: CARDIOLOGY | Facility: CLINIC | Age: 62
Discharge: HOME | End: 2017-03-14
Attending: INTERNAL MEDICINE | Admitting: INTERNAL MEDICINE
Payer: COMMERCIAL

## 2017-03-14 ENCOUNTER — OFFICE VISIT (OUTPATIENT)
Dept: CARDIOLOGY | Facility: CLINIC | Age: 62
End: 2017-03-14
Attending: NURSE PRACTITIONER
Payer: COMMERCIAL

## 2017-03-14 VITALS
BODY MASS INDEX: 31.35 KG/M2 | HEART RATE: 73 BPM | OXYGEN SATURATION: 96 % | SYSTOLIC BLOOD PRESSURE: 145 MMHG | DIASTOLIC BLOOD PRESSURE: 91 MMHG | WEIGHT: 172.8 LBS

## 2017-03-14 DIAGNOSIS — I48.0 PAROXYSMAL ATRIAL FIBRILLATION (H): Primary | ICD-10-CM

## 2017-03-14 DIAGNOSIS — I48.0 PAROXYSMAL ATRIAL FIBRILLATION (H): ICD-10-CM

## 2017-03-14 PROCEDURE — 93005 ELECTROCARDIOGRAM TRACING: CPT

## 2017-03-14 PROCEDURE — 99204 OFFICE O/P NEW MOD 45 MIN: CPT | Performed by: INTERNAL MEDICINE

## 2017-03-14 NOTE — PATIENT INSTRUCTIONS
Thank you for your [facility] visit today. Your provider has recommended the following:  Medication Changes:  None today. Continue taking your medications as you have been.  Recommendations:  EKG today  Zio patch next available  Echocardiogram next available  Follow-up:  See Dr. Hebert for cardiology follow up after in 1 month.  We kindly ask that you call cardiology scheduling at [phone] three months prior to requested revisit date to schedule future cardiology appointments.  Reminder:  1. Please bring in your current medication list or your medication, over the counter supplements and vitamin bottles as we will review these at each office visit.               [facility]  [facility]~[address]~[place], [place]~[address]  Questions about your visit today?  Call your Cardiology Clinic RN's-Shellie Milligan and/or Jolanta Paulson at [phone].

## 2017-03-14 NOTE — LETTER
3/14/2017    JACKELINE Diez [facility] [place]  [address] [place] [address]    RE: Odilon Perea       Dear Colleague,    I had the pleasure of seeing Odilon Perea in the [facility].    REASON FOR VISIT:  Evaluation of atrial fibrillation.      Mr. Perea is a pleasant 72-year-old gentleman with a history of hypertension and paroxysmal atrial fibrillation who is here for evaluation.      The patient is a very poor historian.  He only speaks Tongan and is originally from [place].  Apparently, in 2009 he was admitted in the hospital in [place].  Per patient, he underwent a stress test which apparently was abnormal and finally had a coronary angiography which was within normal limits.  He was told that he had an enlarged heart and also had problems with atrial fibrillation while in the hospital.      He is currently on therapy with HCTZ for blood pressure and not taking any other medications.      He was referred here for cardiovascular evaluation.  At the moment, he is doing well.  He informs me that there are moments that he feels lightheadedness.  He also describes atypical chest discomfort that are sporadic and happen at rest.  He denies any other symptoms such as syncope or near-syncopal episode or shortness of breath.      Of note,  his primary care physician has attempted to go obtain records from [place], however, this has been unsuccessful.     Outpatient Encounter Prescriptions as of 3/14/2017   Medication Sig Dispense Refill     omeprazole (PRILOSEC) 20 MG CR capsule Take 1 capsule (20 mg) by mouth daily 90 capsule 1     naproxen (NAPROSYN) 500 MG tablet Take 1 tablet (500 mg) by mouth 2 times daily (with meals) 180 tablet 0     hydrochlorothiazide (HYDRODIURIL) 25 MG tablet Take 1 tablet (25 mg) by mouth daily 90 tablet 0     No facility-administered encounter medications on file as of 3/14/2017.       ASSESSMENT AND PLAN:   1.   Cardiovascular evaluation.  It is unclear what exactly happened to him back in [place].  He seems to be in normal rhythm enriqueta.  I recommend obtaining an echocardiogram, Zio Patch monitor and EKG today.  He will follow up with me in a month to go over results.   2.  Hypertension.  Blood pressure is mildly elevated today.  He uses HCTZ.  We will continue current therapy for now.  We will readdress blood pressure therapy in a month.   3.  Embolic prevention.  He has a CHADS-VASc score of 1.  Unclear if he truly has atrial fibrillation as I have no documentation of Afib.  I will continue baby aspirin for now.     Again, thank you for allowing me to participate in the care of your patient.      Sincerely,    Misha Hebert MD     [facility]

## 2017-03-14 NOTE — PROGRESS NOTES
HPI and Plan:   See dictation  [id number]    Physical Exam:  Vitals: BP (!) 145/91 (BP Location: Right arm, Patient Position: Chair, Cuff Size: Adult Regular)  Pulse 73  Wt 78.4 kg (172 lb 12.8 oz)  SpO2 96%  BMI 31.35 kg/m2    Constitutional:  AAO x3.  Pt is in NAD.  HEAD: normocephalic.  SKIN: Skin normal color, texture and turgor with no lesions or eruptions.  Eyes: PERRL, EOMI.  ENT:  Supple, normal JVP. No lymphadenopathy or thyroid enlargement.  Chest:  CTAB.  Cardiac:  RRR, normal  S1 and S2.  No murmurs rubs or gallop.    Abdomen:  Normal BS.  Soft, non-tender and non-distended.  No rebound or guarding.    Extremities:  Pedious pulses palpable B/L.  No LE edema noticed.   Neurological: Strength and sensation grossly symmetric and intact throughout.       CURRENT MEDICATIONS:  Current Outpatient Prescriptions   Medication Sig Dispense Refill     omeprazole (PRILOSEC) 20 MG CR capsule Take 1 capsule (20 mg) by mouth daily 90 capsule 1     naproxen (NAPROSYN) 500 MG tablet Take 1 tablet (500 mg) by mouth 2 times daily (with meals) 180 tablet 0     hydrochlorothiazide (HYDRODIURIL) 25 MG tablet Take 1 tablet (25 mg) by mouth daily 90 tablet 0       ALLERGIES   No Known Allergies    PAST MEDICAL HISTORY:  No past medical history on file.    PAST SURGICAL HISTORY:  Past Surgical History   Procedure Laterality Date     Colonoscopy       Angiogram       unsure of results       FAMILY HISTORY:  Family History   Problem Relation Age of Onset     Ovarian Cancer Mother      Alzheimer Disease Father      DIABETES Brother      3 brothers with diabetes     Other - See Comments Sister      has an illness unsure what it is.        SOCIAL HISTORY:  Social History     Social History     Marital status: Single     Spouse name: N/A     Number of children: N/A     Years of education: N/A     Social History Main Topics     Smoking status: Former Smoker     Smokeless tobacco: None     Alcohol use No     Drug use: No     Sexual  activity: Yes     Partners: Male     Other Topics Concern     Parent/Sibling W/ Cabg, Mi Or Angioplasty Before 65f 55m? No     Social History Narrative       Review of Systems:  Skin:  Positive for bruising;scaling   Eyes:  Positive for visual blurring  ENT:  Positive for hearing loss;tinnitus  Respiratory:  Positive for dyspnea on exertion;cough  Cardiovascular:    Positive for;palpitations;chest pain;edema;fatigue;dizziness  Gastroenterology: Positive for reflux;excessive gas or bloating  Genitourinary:  Positive for urgency;urinary frequency  Musculoskeletal:  Positive for arthritis  Neurologic:  Positive for headaches;numbness or tingling of hands;tremors  Psychiatric:  Positive for depression  Heme/Lymph/Imm:  Negative    Endocrine:  Negative        Recent Lab Results:  LIPID RESULTS:  Lab Results   Component Value Date    CHOL 145 02/16/2017    HDL 39 (L) 02/16/2017    LDL 81 02/16/2017    TRIG 123 02/16/2017       LIVER ENZYME RESULTS:  Lab Results   Component Value Date    AST 16 02/09/2017    ALT 23 02/09/2017       CBC RESULTS:  Lab Results   Component Value Date    WBC 7.1 02/09/2017    RBC 4.56 02/09/2017    HGB 14.3 02/09/2017    HCT 42.7 02/09/2017    MCV 94 02/09/2017    MCH 31.4 02/09/2017    MCHC 33.5 02/09/2017    RDW 12.7 02/09/2017     02/09/2017       BMP RESULTS:  Lab Results   Component Value Date     02/09/2017    POTASSIUM 3.6 02/09/2017    CHLORIDE 105 02/09/2017    CO2 27 02/09/2017    ANIONGAP 6 02/09/2017    GLC 84 02/09/2017    BUN 18 02/09/2017    CR 0.98 02/09/2017    GFRESTIMATED 77 02/09/2017    GFRESTBLACK >90   GFR Calc   02/09/2017    MISHA 8.6 02/09/2017        A1C RESULTS:  No results found for: A1C    INR RESULTS:  No results found for: INR      ECHOCARDIOGRAM  No results found for this or any previous visit (from the past 8760 hour(s)).      Orders Placed This Encounter   Procedures     Follow-Up with Electrophysiologist     Stuart Patch Monitor      EKG 12-LEAD CLINIC READ ([facility])- to be scheduled     Echocardiogram     No orders of the defined types were placed in this encounter.    There are no discontinued medications.      Encounter Diagnosis   Name Primary?     Paroxysmal atrial fibrillation (H) Yes         CC  JACKELINE Diez [address] [place] [address]

## 2017-03-14 NOTE — MR AVS SNAPSHOT
After Visit Summary   3/14/2017    Odilon Perea    MRN: [id number]           Patient Information     Date Of Birth          1955        Visit Information        Provider Department      3/14/2017 10:45 Misha Read MD; [employer] [facility] PHYSICIAN HEART AT [facility]        Today's Diagnoses     Paroxysmal atrial fibrillation (H)    -  1      Care Instructions    Thank you for your [facility] visit today. Your provider has recommended the following:  Medication Changes:  None today. Continue taking your medications as you have been.  Recommendations:  EKG today  Zio patch next available  Echocardiogram next available  Follow-up:  See Dr. Hebert for cardiology follow up after in 1 month.  We kindly ask that you call cardiology scheduling at [phone] three months prior to requested revisit date to schedule future cardiology appointments.  Reminder:  1. Please bring in your current medication list or your medication, over the counter supplements and vitamin bottles as we will review these at each office visit.               [facility] HEART CARE  [facility]~[address]~[place], [place]~[address]  Questions about your visit today?  Call your Cardiology Clinic RN's-Shellie Milligan and/or Jolanta Paulson at [phone].              Follow-ups after your visit        Additional Services     Follow-Up with Electrophysiologist                 Future tests that were ordered for you today     Open Future Orders        Priority Expected Expires Ordered    EKG 12-LEAD CLINIC READ ([facility])- to be scheduled Routine 3/14/2017 3/14/2018 3/14/2017    Follow-Up with Electrophysiologist Routine 4/13/2017 3/14/2018 3/14/2017    Echocardiogram Routine 3/21/2017 3/14/2018 3/14/2017    Zio Patch Monitor Routine 3/21/2017 3/14/2018 3/14/2017            Who to contact     If you have questions or need follow up information  "about today's clinic visit or your schedule please contact [facility] PHYSICIAN HEART AT [facility] directly at [phone].  Normal or non-critical lab and imaging results will be communicated to you by [employer], letter or phone within 4 business days after the clinic has received the results. If you do not hear from us within 7 days, please contact the clinic through [employer] or phone. If you have a critical or abnormal lab result, we will notify you by phone as soon as possible.  Submit refill requests through [employer] or call your pharmacy and they will forward the refill request to us. Please allow 3 business days for your refill to be completed.          Additional Information About Your Visit        [facility] Provider [email or web address] Information     [employer] lets you send messages to your doctor, view your test results, renew your prescriptions, schedule appointments and more. To sign up, go to [email or web address] . Click on \"Log in\" on the left side of the screen, which will take you to the Welcome page. Then click on \"Sign up Now\" on the right side of the page.     You will be asked to enter the access code listed below, as well as some personal information. Please follow the directions to create your username and password.     Your access code is: [email or web address]  Expires: 5/10/2017  3:51 PM     Your access code will  in 90 days. If you need help or a new code, please call your [place] clinic or [phone].        Care EveryWhere ID     This is your Care EveryWhere ID. This could be used by other organizations to access your [place] medical records  [id number]        Your Vitals Were     Pulse Pulse Oximetry BMI (Body Mass Index)             73 96% 31.35 kg/m2          Blood Pressure from Last 3 Encounters:   17 (!) 145/91   17 116/80   17 124/86    Weight from Last 3 Encounters:   17 78.4 kg (172 lb 12.8 oz)   17 76.7 kg (169 lb 3.2 oz)   17 78.2 kg (172 lb 6.4 oz)    "            Primary Care Provider Office Phone # Fax #    JACKELINE Diez [phone]       [facility] [address]   [place] [address]        Thank you!     Thank you for choosing [facility] PHYSICIAN HEART AT [facility]  for your care. Our goal is always to provide you with excellent care. Hearing back from our patients is one way we can continue to improve our services. Please take a few minutes to complete the written survey that you may receive in the mail after your visit with us. Thank you!             Your Updated Medication List - Protect others around you: Learn how to safely use, store and throw away your medicines at www.disposemymeds.org.          This list is accurate as of: 3/14/17 11:22 AM.  Always use your most recent med list.                   Brand Name Dispense Instructions for use    hydrochlorothiazide 25 MG tablet    HYDRODIURIL    90 tablet    Take 1 tablet (25 mg) by mouth daily       naproxen 500 MG tablet    NAPROSYN    180 tablet    Take 1 tablet (500 mg) by mouth 2 times daily (with meals)       omeprazole 20 MG CR capsule    priLOSEC    90 capsule    Take 1 capsule (20 mg) by mouth daily

## 2017-03-14 NOTE — PROGRESS NOTES
REASON FOR VISIT:  Evaluation of atrial fibrillation.      HISTORY OF PRESENT ILLNESS:  Mr. Perea is a pleasant 72-year-old gentleman with a history of hypertension and paroxysmal atrial fibrillation who is here for evaluation.      The patient is a very poor historian.  He only speaks Khmer and is originally from [place].  Apparently, in 2009 he was admitted in the hospital in [place].  Per patient, he underwent a stress test which apparently was abnormal and finally had a coronary angiography which was within normal limits.  He was told that he had an enlarged heart and also had problems with atrial fibrillation while in the hospital.      He is currently on therapy with HCTZ for blood pressure and not taking any other medications.      He was referred here for cardiovascular evaluation.  At the moment, he is doing well.  He informs me that there are moments that he feels lightheadedness.  He also describes atypical chest discomfort that are sporadic and happen at rest.  He denies any other symptoms such as syncope or near-syncopal episode or shortness of breath.      Of note,  his primary care physician has attempted to go obtain records from [place], however, this has been unsuccessful.      ASSESSMENT AND PLAN:   1.  Cardiovascular evaluation.  It is unclear what exactly happened to him back in [place].  He seems to be in normal rhythm enriqueta.  I recommend obtaining an echocardiogram, Zio Patch monitor and EKG today.  He will follow up with me in a month to go over results.   2.  Hypertension.  Blood pressure is mildly elevated today.  He uses HCTZ.  We will continue current therapy for now.  We will readdress blood pressure therapy in a month.   3.  Embolic prevention.  He has a CHADS-VASc score of 1.  Unclear if he truly has atrial fibrillation as I have no documentation of Afib.  I will continue baby aspirin for now.         WALKER WASSERMAN MD             D: 03/14/2017 11:22   T: 03/14/2017 14:38   MT: FRANNY       Name:     SEGUNDO RIVERA   MRN:      [id number]        Account:      [id number]   :      1955           Service Date: 2017      Document: [id number]

## 2017-03-23 ENCOUNTER — HOSPITAL ENCOUNTER (OUTPATIENT)
Dept: CARDIOLOGY | Facility: CLINIC | Age: 62
End: 2017-03-23
Attending: INTERNAL MEDICINE
Payer: COMMERCIAL

## 2017-03-23 ENCOUNTER — HOSPITAL ENCOUNTER (OUTPATIENT)
Dept: CARDIOLOGY | Facility: CLINIC | Age: 62
Discharge: HOME | End: 2017-03-23
Attending: INTERNAL MEDICINE | Admitting: INTERNAL MEDICINE
Payer: COMMERCIAL

## 2017-03-23 DIAGNOSIS — I48.0 PAROXYSMAL ATRIAL FIBRILLATION (H): ICD-10-CM

## 2017-03-23 PROCEDURE — 93306 TTE W/DOPPLER COMPLETE: CPT

## 2017-03-23 PROCEDURE — 0296T ZIO PATCH HOLTER: CPT

## 2017-03-23 PROCEDURE — 93306 TTE W/DOPPLER COMPLETE: CPT | Mod: 26 | Performed by: INTERNAL MEDICINE

## 2017-03-23 PROCEDURE — 0298T ZZC EXT ECG > 48HR TO 21 DAY REVIEW AND INTERPRETATN: CPT | Performed by: INTERNAL MEDICINE

## 2017-04-05 ENCOUNTER — TELEPHONE (OUTPATIENT)
Dept: CARDIOLOGY | Facility: CLINIC | Age: 62
End: 2017-04-05

## 2017-04-05 NOTE — TELEPHONE ENCOUNTER
Message  Received: Today       Misha Hebert MD  [facility] Heart Ep Nurse                     Normal echo.  Please update pt on results.  Misha Hebert MD       Writer called pt with results of Echo as above. Pt verbalized understanding. JOSE DANIEL Cohen RN.

## 2017-04-10 ENCOUNTER — TELEPHONE (OUTPATIENT)
Dept: CARDIOLOGY | Facility: CLINIC | Age: 62
End: 2017-04-10

## 2017-04-10 DIAGNOSIS — I47.10 PAROXYSMAL SUPRAVENTRICULAR TACHYCARDIA (H): Primary | ICD-10-CM

## 2017-04-10 NOTE — TELEPHONE ENCOUNTER
----- Message from Misha Hebert MD sent at 4/7/2017  4:01 PM CDT -----  Ziopatch showed several runs of PSVT.  NO clear signs of Afib noticed.    We should start him on Metoprolol XL (25 mg daily).    Please update pt on results.    Misha Hebert

## 2017-04-11 RX ORDER — METOPROLOL SUCCINATE 25 MG/1
25 TABLET, EXTENDED RELEASE ORAL DAILY
Qty: 30 TABLET | Refills: 11 | Status: SHIPPED | OUTPATIENT
Start: 2017-04-11 | End: 2017-09-25

## 2017-04-11 NOTE — TELEPHONE ENCOUNTER
Called and spoke with sister and informed her of ziopatch results showing some SVT but no afib. Dr Hebert recommends starting metoprolol and she agreed. Sent RX to pharmacy. He has f/u with Dr Hebert at [facility] on 5/2/17.

## 2017-06-05 ENCOUNTER — TELEPHONE (OUTPATIENT)
Dept: FAMILY MEDICINE | Facility: CLINIC | Age: 62
End: 2017-06-05

## 2017-06-05 NOTE — TELEPHONE ENCOUNTER
Panel Management Review      Patient has the following on his problem list:     Hypertension   Last three blood pressure readings:  BP Readings from Last 3 Encounters:   03/14/17 (!) 145/91   02/23/17 116/80   02/16/17 124/86     Blood pressure: FAILED    HTN Guidelines:  Age 18-59 BP range:  Less than 140/90  Age 60-85 with Diabetes:  Less than 140/90  Age 60-85 without Diabetes:  less than 150/90      Composite cancer screening  Chart review shows that this patient is due/due soon for the following Colonoscopy  Summary:    Patient is due/failing the following:   BP CHECK and COLONOSCOPY    Action needed:   Patient needs nurse only visit for BP check. Needs to schedule colonoscopy-ordered 2/23/17    Type of outreach:    Sent letter.    Questions for provider review:    None                                                                                                                                    Kailee Simons CMA

## 2017-06-12 DIAGNOSIS — M25.50 MULTIPLE JOINT PAIN: ICD-10-CM

## 2017-06-12 DIAGNOSIS — I10 BENIGN ESSENTIAL HYPERTENSION: ICD-10-CM

## 2017-06-13 RX ORDER — NAPROXEN 500 MG/1
TABLET ORAL
Qty: 180 TABLET | Refills: 0 | Status: SHIPPED | OUTPATIENT
Start: 2017-06-13 | End: 2017-09-25

## 2017-06-13 RX ORDER — HYDROCHLOROTHIAZIDE 25 MG/1
TABLET ORAL
Qty: 90 TABLET | Refills: 0 | Status: SHIPPED | OUTPATIENT
Start: 2017-06-13 | End: 2017-09-25

## 2017-06-13 NOTE — TELEPHONE ENCOUNTER
Naproxen 500mg      Last Written Prescription Date: 02/16/2017 #180 x 0  Last filled 04/10/2017  Last Office Visit with G, P or [facility] prescribing provider: 02/23/2017 LULA Back       Creatinine   Date Value Ref Range Status   02/09/2017 0.98 0.66 - 1.25 mg/dL Final     Lab Results   Component Value Date    AST 16 02/09/2017     Lab Results   Component Value Date    ALT 23 02/09/2017     BP Readings from Last 3 Encounters:   03/14/17 (!) 145/91   02/23/17 116/80   02/16/17 124/86

## 2017-06-13 NOTE — TELEPHONE ENCOUNTER
Prescription approved per [facility] Refill Protocol.  Chelo Morgan RN

## 2017-06-13 NOTE — TELEPHONE ENCOUNTER
HCTZ  25mg      Last Written Prescription Date: 02/09/2017  #90 x 0  Last filled 04/10/2017  Last Office Visit with G, [facility] or [facility] prescribing provider: 02/23/2017  LULA Back       Potassium   Date Value Ref Range Status   02/09/2017 3.6 3.4 - 5.3 mmol/L Final     Creatinine   Date Value Ref Range Status   02/09/2017 0.98 0.66 - 1.25 mg/dL Final     BP Readings from Last 3 Encounters:   03/14/17 (!) 145/91   02/23/17 116/80   02/16/17 124/86

## 2017-07-06 ENCOUNTER — ALLIED HEALTH/NURSE VISIT (OUTPATIENT)
Dept: FAMILY MEDICINE | Facility: CLINIC | Age: 62
End: 2017-07-06
Payer: COMMERCIAL

## 2017-07-06 VITALS
WEIGHT: 173 LBS | SYSTOLIC BLOOD PRESSURE: 140 MMHG | HEART RATE: 60 BPM | TEMPERATURE: 98 F | BODY MASS INDEX: 32.66 KG/M2 | DIASTOLIC BLOOD PRESSURE: 82 MMHG | HEIGHT: 61 IN

## 2017-07-06 DIAGNOSIS — I48.0 PAF (PAROXYSMAL ATRIAL FIBRILLATION) (H): ICD-10-CM

## 2017-07-06 DIAGNOSIS — M62.81 GENERALIZED MUSCLE WEAKNESS: Primary | ICD-10-CM

## 2017-07-06 LAB
ALBUMIN SERPL-MCNC: 4.2 G/DL (ref 3.4–5)
ALP SERPL-CCNC: 69 U/L (ref 40–150)
ALT SERPL W P-5'-P-CCNC: 31 U/L (ref 0–70)
ANION GAP SERPL CALCULATED.3IONS-SCNC: 7 MMOL/L (ref 3–14)
AST SERPL W P-5'-P-CCNC: 31 U/L (ref 0–45)
BASOPHILS # BLD AUTO: 0.1 10E9/L (ref 0–0.2)
BASOPHILS NFR BLD AUTO: 0.8 %
BILIRUB SERPL-MCNC: 0.9 MG/DL (ref 0.2–1.3)
BUN SERPL-MCNC: 20 MG/DL (ref 7–30)
CALCIUM SERPL-MCNC: 8.8 MG/DL (ref 8.5–10.1)
CHLORIDE SERPL-SCNC: 104 MMOL/L (ref 94–109)
CO2 SERPL-SCNC: 27 MMOL/L (ref 20–32)
CREAT SERPL-MCNC: 0.92 MG/DL (ref 0.66–1.25)
DIFFERENTIAL METHOD BLD: NORMAL
EOSINOPHIL # BLD AUTO: 0.1 10E9/L (ref 0–0.7)
EOSINOPHIL NFR BLD AUTO: 1.7 %
ERYTHROCYTE [DISTWIDTH] IN BLOOD BY AUTOMATED COUNT: 12.7 % (ref 10–15)
GFR SERPL CREATININE-BSD FRML MDRD: 83 ML/MIN/1.7M2
GLUCOSE SERPL-MCNC: 128 MG/DL (ref 70–99)
HCT VFR BLD AUTO: 44.9 % (ref 40–53)
HGB BLD-MCNC: 15.3 G/DL (ref 13.3–17.7)
LYMPHOCYTES # BLD AUTO: 2.4 10E9/L (ref 0.8–5.3)
LYMPHOCYTES NFR BLD AUTO: 34.2 %
MCH RBC QN AUTO: 31 PG (ref 26.5–33)
MCHC RBC AUTO-ENTMCNC: 34.1 G/DL (ref 31.5–36.5)
MCV RBC AUTO: 91 FL (ref 78–100)
MONOCYTES # BLD AUTO: 0.6 10E9/L (ref 0–1.3)
MONOCYTES NFR BLD AUTO: 8.1 %
NEUTROPHILS # BLD AUTO: 3.9 10E9/L (ref 1.6–8.3)
NEUTROPHILS NFR BLD AUTO: 55.2 %
PLATELET # BLD AUTO: 211 10E9/L (ref 150–450)
POTASSIUM SERPL-SCNC: 3.2 MMOL/L (ref 3.4–5.3)
PROT SERPL-MCNC: 7.4 G/DL (ref 6.8–8.8)
RBC # BLD AUTO: 4.93 10E12/L (ref 4.4–5.9)
SODIUM SERPL-SCNC: 138 MMOL/L (ref 133–144)
TSH SERPL DL<=0.005 MIU/L-ACNC: 0.7 MU/L (ref 0.4–4)
WBC # BLD AUTO: 7.1 10E9/L (ref 4–11)

## 2017-07-06 PROCEDURE — 80050 GENERAL HEALTH PANEL: CPT | Performed by: NURSE PRACTITIONER

## 2017-07-06 PROCEDURE — 84443 ASSAY THYROID STIM HORMONE: CPT | Performed by: NURSE PRACTITIONER

## 2017-07-06 PROCEDURE — 80053 COMPREHEN METABOLIC PANEL: CPT | Performed by: NURSE PRACTITIONER

## 2017-07-06 PROCEDURE — 99214 OFFICE O/P EST MOD 30 MIN: CPT | Performed by: NURSE PRACTITIONER

## 2017-07-06 PROCEDURE — 36415 COLL VENOUS BLD VENIPUNCTURE: CPT | Performed by: NURSE PRACTITIONER

## 2017-07-06 PROCEDURE — 93000 ELECTROCARDIOGRAM COMPLETE: CPT | Performed by: NURSE PRACTITIONER

## 2017-07-06 ASSESSMENT — ENCOUNTER SYMPTOMS
LIGHT-HEADEDNESS: 0
DIZZINESS: 1
COUGH: 0
SORE THROAT: 0
RHINORRHEA: 0
PALPITATIONS: 0
DYSURIA: 0
CONSTIPATION: 0
FATIGUE: 0
SHORTNESS OF BREATH: 0
FREQUENCY: 0
ARTHRALGIAS: 0
WHEEZING: 0
JOINT SWELLING: 0
ABDOMINAL PAIN: 0
NUMBNESS: 0
DIARRHEA: 0
WEAKNESS: 1
HEADACHES: 0
SINUS PRESSURE: 0

## 2017-07-06 NOTE — LETTER
[facility]  [address] [address]  Phone: [phone]    July 7, 2017    Odilon Perea  [address] [address]              Odilon,   Your potassium level is just a bit low. This is likely due to taking HCTZ for your blood pressure. Try to eat high potassium foods like bananas, potatoes and avocados. Should plan to recheck this in 4 weeks or so.     Please call or email with any additional questions or concerns   JACKELINE Ying CNP

## 2017-07-06 NOTE — LETTER
[facility]  [address] [address]  Phone: [phone]    July 7, 2017    Odilon Perea  [address] [address]              Odilon,   Your blood counts are normal.     Please call or email with any additional questions or concerns   JACKELINE Ying CNP

## 2017-07-06 NOTE — MR AVS SNAPSHOT
"              After Visit Summary   7/6/2017    Odilon Perea    MRN: [id number]           Patient Information     Date Of Birth          1955        Visit Information        Provider Department      7/6/2017 2:15 PM Laila Back APRN CNP [facility]        Today's Diagnoses     Irregular heartbeat    -  1    Generalized muscle weakness          Care Instructions    Notify if blood pressure is consistently above 130/80.           Follow-ups after your visit        Your next 10 appointments already scheduled     Aug 22, 2017  3:30 PM CDT   [facility] EP RETURN with Misha Hebert MD   [facility] PHYSICIAN HEART AT [facility] ([facility])    [address] [address] [phone]              Who to contact     Normal or non-critical lab and imaging results will be communicated to you by MyChart, letter or phone within 4 business days after the clinic has received the results. If you do not hear from us within 7 days, please contact the clinic through MyChart or phone. If you have a critical or abnormal lab result, we will notify you by phone as soon as possible.  Submit refill requests through [email or web address] or call your pharmacy and they will forward the refill request to us. Please allow 3 business days for your refill to be completed.          If you need to speak with a  for additional information , please call: [phone]           Additional Information About Your Visit        [employer] Information     [email or web address] lets you send messages to your doctor, view your test results, renew your prescriptions, schedule appointments and more. To sign up, go to [email or web address] . Click on \"Log in\" on the left side of the screen, which will take you to the Welcome page. Then click on \"Sign up Now\" on the right side of the page.     You will be asked to enter the access code listed below, as well as some personal information. Please " follow the directions to create your username and password.     Your access code is: [email or web address]  Expires: 10/4/2017  3:01 PM     Your access code will  in 90 days. If you need help or a new code, please call your [place] clinic or [phone].        Care EveryWhere ID     This is your Care EveryWhere ID. This could be used by other organizations to access your [place] medical records  [id number]        Your Vitals Were     Pulse Temperature                60 98  F (36.7  C) (Tympanic)           Blood Pressure from Last 3 Encounters:   17 140/82   17 (!) 145/91   17 116/80    Weight from Last 3 Encounters:   17 172 lb 12.8 oz (78.4 kg)   17 169 lb 3.2 oz (76.7 kg)   17 172 lb 6.4 oz (78.2 kg)              We Performed the Following     CBC with platelets differential     Comprehensive metabolic panel     EKG 12-lead complete w/read - Clinics     TSH with free T4 reflex        Primary Care Provider Office Phone # Fax #    Laila Swetha Back, APRN [phone]       [facility] [address] [place] [address]   [place] [address]        Equal Access to Services     EILEEN HAYWARD AH: Hadii aad ku hadasho Soomaali, waaxda luqadaha, qaybta kaalmada adeegyada, waxay idiin haymeghann ruben salgado. So [facility] [phone].    ATENCIÓN: Si habla español, tiene a purvis disposición servicios gratuitos de asistencia lingüística. christopher al [phone].    We comply with applicable federal civil rights laws and [place] laws. We do not discriminate on the basis of race, color, national origin, age, disability sex, sexual orientation or gender identity.            Thank you!     Thank you for choosing [facility]  for your care. Our goal is always to provide you with excellent care. Hearing back from our patients is one way we can continue to improve our services. Please take a few minutes to complete the written survey that you may receive in the  mail after your visit with us. Thank you!             Your Updated Medication List - Protect others around you: Learn how to safely use, store and throw away your medicines at www.disposemymeds.org.          This list is accurate as of: 7/6/17  3:01 PM.  Always use your most recent med list.                   Brand Name Dispense Instructions for use Diagnosis    hydrochlorothiazide 25 MG tablet    HYDRODIURIL    90 tablet    TAKE ONE TABLET BY MOUTH EVERY DAY    Benign essential hypertension       metoprolol 25 MG 24 hr tablet    TOPROL-XL    30 tablet    Take 1 tablet (25 mg) by mouth daily    Paroxysmal supraventricular tachycardia (H)       naproxen 500 MG tablet    NAPROSYN    180 tablet    TAKE ONE TABLET BY MOUTH TWICE A DAY WITH MEALS    Multiple joint pain       omeprazole 20 MG CR capsule    priLOSEC    90 capsule    Take 1 capsule (20 mg) by mouth daily    Gastroesophageal reflux disease with esophagitis

## 2017-07-06 NOTE — PROGRESS NOTES
SUBJECTIVE:                                                    Odilon Perea is a 62 year old male who presents to clinic today for the following health issues:      Patient came in for a blood pressure check. He states he has been feeling dizzy for awhile.   [facility]      Problem list and histories reviewed & adjusted, as indicated.  Additional history: as documented    Current Outpatient Prescriptions   Medication Sig Dispense Refill     naproxen (NAPROSYN) 500 MG tablet TAKE ONE TABLET BY MOUTH TWICE A DAY WITH MEALS 180 tablet 0     hydrochlorothiazide (HYDRODIURIL) 25 MG tablet TAKE ONE TABLET BY MOUTH EVERY DAY 90 tablet 0     metoprolol (TOPROL-XL) 25 MG 24 hr tablet Take 1 tablet (25 mg) by mouth daily 30 tablet 11     omeprazole (PRILOSEC) 20 MG CR capsule Take 1 capsule (20 mg) by mouth daily 90 capsule 1     No Known Allergies    Reviewed and updated as needed this visit by clinical staff       Reviewed and updated as needed this visit by Provider          No  present at today's visit. Came to office today and was on [facility] schedule for blood pressure check. Has not been feeling like self. Has been feeling weaker and a bit more dizzy. Thought blood pressure was not right so came into have checked. No recent falls.     ROS:  Review of Systems   Constitutional: Negative for fatigue.   HENT: Negative for congestion, ear pain, rhinorrhea, sinus pressure and sore throat.    Respiratory: Negative for cough, shortness of breath and wheezing.    Cardiovascular: Negative for chest pain, palpitations and leg swelling.   Gastrointestinal: Negative for abdominal pain, constipation and diarrhea.   Genitourinary: Negative for dysuria and frequency.   Musculoskeletal: Negative for arthralgias and joint swelling.   Skin: Negative for rash.   Neurological: Positive for dizziness and weakness. Negative for light-headedness, numbness and headaches.       OBJECTIVE:     /82  Pulse 60  Temp 98  F  "(36.7  C) (Tympanic)  Ht 5' 0.5\" (1.537 m)  Wt 173 lb (78.5 kg)  BMI 33.23 kg/m2  Body mass index is 33.23 kg/(m^2).  Physical Exam   Constitutional: He appears well-developed and well-nourished.   HENT:   Head: Normocephalic and atraumatic.   Right Ear: Tympanic membrane and external ear normal.   Left Ear: Tympanic membrane and external ear normal.   Nose: No mucosal edema or rhinorrhea.   Mouth/Throat: Uvula is midline, oropharynx is clear and moist and mucous membranes are normal.   Neck: Carotid bruit is not present. No thyromegaly present.   Cardiovascular: Normal rate, regular rhythm and normal heart sounds.    Pulmonary/Chest: Effort normal and breath sounds normal.   Abdominal: Soft. Bowel sounds are normal.   Neurological: He is alert.   Skin: Skin is warm and dry.   Psychiatric: He has a normal mood and affect.         ASSESSMENT/PLAN:   1. Generalized muscle weakness  Check basic labs today   - CBC with platelets differential  - Comprehensive metabolic panel  - TSH with free T4 reflex    2. PAF (paroxysmal atrial fibrillation) (H)  EKG in office today--NSR  - EKG 12-lead complete w/read - Clinics    Reviewed warning signs and when would need to seek further medical attention  Parameters given for blood pressure.     JACKELINE [facility]"

## 2017-07-06 NOTE — NURSING NOTE
"Chief Complaint   Patient presents with     Hypertension       Initial /82  Pulse 60  Temp 98  F (36.7  C) (Tympanic)  Ht 5' 0.5\" (1.537 m)  Wt 173 lb (78.5 kg)  BMI 33.23 kg/m2 Estimated body mass index is 33.23 kg/(m^2) as calculated from the following:    Height as of this encounter: 5' 0.5\" (1.537 m).    Weight as of this encounter: 173 lb (78.5 kg).  Medication Reconciliation: christian Purcell CMA      "

## 2017-09-25 ENCOUNTER — OFFICE VISIT (OUTPATIENT)
Dept: FAMILY MEDICINE | Facility: CLINIC | Age: 62
End: 2017-09-25
Payer: COMMERCIAL

## 2017-09-25 VITALS — OXYGEN SATURATION: 96 % | DIASTOLIC BLOOD PRESSURE: 96 MMHG | SYSTOLIC BLOOD PRESSURE: 148 MMHG | HEART RATE: 65 BPM

## 2017-09-25 DIAGNOSIS — M25.50 MULTIPLE JOINT PAIN: ICD-10-CM

## 2017-09-25 DIAGNOSIS — I10 BENIGN ESSENTIAL HYPERTENSION: ICD-10-CM

## 2017-09-25 DIAGNOSIS — I47.10 PAROXYSMAL SUPRAVENTRICULAR TACHYCARDIA (H): ICD-10-CM

## 2017-09-25 DIAGNOSIS — K21.00 GASTROESOPHAGEAL REFLUX DISEASE WITH ESOPHAGITIS: ICD-10-CM

## 2017-09-25 DIAGNOSIS — R07.89 ATYPICAL CHEST PAIN: Primary | ICD-10-CM

## 2017-09-25 PROCEDURE — 93000 ELECTROCARDIOGRAM COMPLETE: CPT | Performed by: FAMILY MEDICINE

## 2017-09-25 PROCEDURE — 99214 OFFICE O/P EST MOD 30 MIN: CPT | Performed by: FAMILY MEDICINE

## 2017-09-25 RX ORDER — HYDROCHLOROTHIAZIDE 25 MG/1
25 TABLET ORAL DAILY
Qty: 90 TABLET | Refills: 1 | Status: SHIPPED | OUTPATIENT
Start: 2017-09-25 | End: 2018-07-12

## 2017-09-25 RX ORDER — METOPROLOL SUCCINATE 25 MG/1
25 TABLET, EXTENDED RELEASE ORAL DAILY
Qty: 90 TABLET | Refills: 1 | Status: SHIPPED | OUTPATIENT
Start: 2017-09-25 | End: 2017-10-03

## 2017-09-25 NOTE — PATIENT INSTRUCTIONS
*    Take the metoprolol and the hydrochlorothiazide.     *    Do not take the naproxen.     *    The swelling will get better when you start the hydrochlorothiazide again, it's a diuretic.     *    Stopping the metoprolol caused the shaking.     *    For pain, you may take tylenol before the colonoscopy.     *    The EKG was good.

## 2017-09-25 NOTE — MR AVS SNAPSHOT
After Visit Summary   9/25/2017    Odilon Perea    MRN: [id number]           Patient Information     Date Of Birth          1955        Visit Information        Provider Department      9/25/2017 1:20 PM Indira Fragoso MD [facility]        Today's Diagnoses     Chest pain    -  1    Benign essential hypertension        Paroxysmal supraventricular tachycardia (H)          Care Instructions    *    Take the metoprolol and the hydrochlorothiazide.     *    Do not take the naproxen.     *    The swelling will get better when you start the hydrochlorothiazide again, it's a diuretic.     *    Stopping the metoprolol caused the shaking.     *    For pain, you may take tylenol before the colonoscopy.     *    The EKG was good.           Follow-ups after your visit        Your next 10 appointments already scheduled     Sep 29, 2017   Procedure with Fredi Leigh MD   [facility] ([facility])    [address] [place] [id number] [phone]           The medical center is located at [address] [place] [address]. (between [address] and [address] in [place], [place] north of [place]).            Oct 03, 2017  1:15 PM CDT   P EP RETURN with Misha Hebert MD   [facility] AT [facility] ([facility])    [address] [place] [id number] [phone]              Who to contact     Normal or non-critical lab and imaging results will be communicated to you by MyChart, letter or phone within 4 business days after the clinic has received the results. If you do not hear from us within 7 days, please contact the clinic through MyChart or phone. If you have a critical or abnormal lab result, we will notify you by phone as soon as possible.  Submit refill requests through [employer] or call your pharmacy and they will forward the refill request to us. Please allow 3 business days for your refill to be  "completed.          If you need to speak with a  for additional information , please call: [phone]           Additional Information About Your Visit        [email or web address] Information     [employer] lets you send messages to your doctor, view your test results, renew your prescriptions, schedule appointments and more. To sign up, go to [email or web address] . Click on \"Log in\" on the left side of the screen, which will take you to the Welcome page. Then click on \"Sign up Now\" on the right side of the page.     You will be asked to enter the access code listed below, as well as some personal information. Please follow the directions to create your username and password.     Your access code is: [email or web address]  Expires: 10/4/2017  3:01 PM     Your access code will  in 90 days. If you need help or a new code, please call your [facility] clinic or [phone].        Care EveryWhere ID     This is your Care EveryWhere ID. This could be used by other organizations to access your [facility] medical records  [id number]        Your Vitals Were     Pulse Pulse Oximetry                65 96%           Blood Pressure from Last 3 Encounters:   17 (!) 148/96   17 140/82   17 (!) 145/91    Weight from Last 3 Encounters:   17 173 lb (78.5 kg)   17 172 lb 12.8 oz (78.4 kg)   17 169 lb 3.2 oz (76.7 kg)              We Performed the Following     EKG 12-lead complete w/read - Clinics          Today's Medication Changes          These changes are accurate as of: 17  2:07 PM.  If you have any questions, ask your nurse or doctor.               These medicines have changed or have updated prescriptions.        Dose/Directions    hydrochlorothiazide 25 MG tablet   Commonly known as:  HYDRODIURIL   This may have changed:  See the new instructions.   Used for:  Benign essential hypertension   Changed by:  Indira Fragoso MD        Dose:  25 mg   Take 1 tablet (25 mg) by mouth daily "   Quantity:  90 tablet   Refills:  1            Where to get your medicines      These medications were sent to [facility] [address]  [address], [facility] [address]     Phone:  [phone]     hydrochlorothiazide 25 MG tablet    metoprolol 25 MG 24 hr tablet                Primary Care Provider Office Phone # Fax #    Laila Back, APRN -[phone]       [address] [address]        Equal Access to Services     CINDY HAYWARD : Hadii aad ku hadasho Soomaali, waaxda luqadaha, qaybta kaalmada adeegyada, waxay vikasin haysriram paz . So [facility] [phone].    ATENCIÓN: Si habla español, tiene a purvis disposición servicios gratuitos de asistencia lingüística. Llame al [phone].    We comply with applicable federal civil rights laws and [place] laws. We do not discriminate on the basis of race, color, national origin, age, disability sex, sexual orientation or gender identity.            Thank you!     Thank you for choosing [facility]  for your care. Our goal is always to provide you with excellent care. Hearing back from our patients is one way we can continue to improve our services. Please take a few minutes to complete the written survey that you may receive in the mail after your visit with us. Thank you!             Your Updated Medication List - Protect others around you: Learn how to safely use, store and throw away your medicines at www.disposemymeds.org.          This list is accurate as of: 9/25/17  2:07 PM.  Always use your most recent med list.                   Brand Name Dispense Instructions for use Diagnosis    hydrochlorothiazide 25 MG tablet    HYDRODIURIL    90 tablet    Take 1 tablet (25 mg) by mouth daily    Benign essential hypertension       metoprolol 25 MG 24 hr tablet    TOPROL-XL    90 tablet    Take 1 tablet (25 mg) by mouth daily     Paroxysmal supraventricular tachycardia (H)       naproxen 500 MG tablet    NAPROSYN    180 tablet    TAKE ONE TABLET BY MOUTH TWICE A DAY WITH MEALS    Multiple joint pain       omeprazole 20 MG CR capsule    priLOSEC    90 capsule    Take 1 capsule (20 mg) by mouth daily    Gastroesophageal reflux disease with esophagitis

## 2017-09-25 NOTE — PROGRESS NOTES
SUBJECTIVE:   Odilon Perea is a 62 year old male who presents to clinic today for the following health issues:    - Patient with concerns about what medications to discontinue prior to colonoscopy. Since running out of medications 4 days ago he states that he is feeling dizzy, lightheaded and having a fuzzy feeling. He does have general body pain with improvement when taking naproxen 500mg qd    Hypertension Follow-up  Hydrochlorothiazide 25mg qd, metoprolol 25mg qd - Patient is not taking these now due to running out 4 days ago    Outpatient blood pressures are not being checked.    Low Salt Diet: no added salt    - PMHx paroxysmal atrial fibrillation      ROS:  Constitutional, HEENT, cardiovascular, pulmonary, gi and gu systems are negative, except as otherwise noted.    This document serves as a record of the services and decisions personally performed and made by Indira Fragoso MD. It was created on his behalf by Melchor Engle, a trained medical scribe. The creation of this document is based the provider's statements to the medical scribe.  Melchor Engle 1:48 PM September 25, 2017  OBJECTIVE:   BP (!) 148/96  Pulse 65  SpO2 96%  There is no height or weight on file to calculate BMI.         GENERAL: healthy, alert and no distress  EYES: Eyes grossly normal to inspection, conjunctivae and sclerae normal  CV: regular rate and rhythm, normal S1 S2, no murmur  MS: no gross musculoskeletal defects noted, no edema  NEURO: Normal strength and tone, mentation intact and speech normal  PSYCH: mentation appears normal, affect normal/bright        ASSESSMENT/PLAN:     (R07.9) Chest pain  (primary encounter diagnosis)  Comment: EKG results normal.   Plan: EKG 12-lead complete w/read - Clinics            (I10) Benign essential hypertension  Comment: Medication refilled, side effects reviewed. Primary prevention.   Plan: hydrochlorothiazide (HYDRODIURIL) 25 MG tablet            (I47.1) Paroxysmal supraventricular  tachycardia (H)  Comment: Medication refilled, side effects reviewed. Primary prevention.   Plan: metoprolol (TOPROL-XL) 25 MG 24 hr tablet            Patient will follow up if symptoms worsen or do not improve. Patient instructed to call with any questions or concerns.        Patient Instructions   *    Take the metoprolol and the hydrochlorothiazide.     *    Do not take the naproxen.     *    The swelling will get better when you start the hydrochlorothiazide again, it's a diuretic.     *    Stopping the metoprolol caused the shaking.     *    For pain, you may take tylenol before the colonoscopy.     *    The EKG was good.      total time spent with pt. was 25 minutes, 20 minutes of the visit was spent discussing the above issues, including pathophysiology, treatment options, and expected outcomes.   The information in this document, created by a scribe for me, accurately reflects the services I personally performed and the decisions made by me. I have reviewed and approved this document for accuracy. 1:49 PM 9/25/2017    Indira Fragoso MD  [facility]

## 2017-09-26 RX ORDER — NAPROXEN 500 MG/1
TABLET ORAL
Qty: 180 TABLET | Refills: 0 | Status: SHIPPED | OUTPATIENT
Start: 2017-09-26 | End: 2018-02-26

## 2017-09-26 NOTE — TELEPHONE ENCOUNTER
Naproxen 500mg      Last Written Prescription Date: 06/13/2017 #180 x 0  Last filled 08/28/2017  Last Office Visit with G, P or [facility] prescribing provider: 09/26/2017 LULA Fragoso       Creatinine   Date Value Ref Range Status   07/06/2017 0.92 0.66 - 1.25 mg/dL Final     Lab Results   Component Value Date    AST 31 07/06/2017     Lab Results   Component Value Date    ALT 31 07/06/2017     BP Readings from Last 3 Encounters:   09/25/17 (!) 148/96   07/06/17 140/82   03/14/17 (!) 145/91

## 2017-09-26 NOTE — TELEPHONE ENCOUNTER
Prescription approved per [facility] Refill Protocol.  Chelo Morgan RN

## 2017-09-28 ENCOUNTER — ANESTHESIA EVENT (OUTPATIENT)
Dept: GASTROENTEROLOGY | Facility: CLINIC | Age: 62
End: 2017-09-28
Payer: COMMERCIAL

## 2017-09-28 NOTE — ANESTHESIA PREPROCEDURE EVALUATION
Anesthesia Evaluation     . Pt has had prior anesthetic. Type: MAC           ROS/MED HX    ENT/Pulmonary:  - neg pulmonary ROS     Neurologic:  - neg neurologic ROS     Cardiovascular:     (+) hypertension----. : . . . :. dysrhythmias a-fib, . Previous cardiac testing date:results:date: results:ECG reviewed date:9/25/17 results:Undetermined Rhythm -First degree A-V block  -With rate variation   Roxi = 256   cv = 17.  BORDERLINE RHYTHM   date: results:          METS/Exercise Tolerance:     Hematologic:  - neg hematologic  ROS       Musculoskeletal:   (+) arthritis, , , -       GI/Hepatic:     (+) GERD       Renal/Genitourinary:  - ROS Renal section negative       Endo:  - neg endo ROS       Psychiatric:  - neg psychiatric ROS       Infectious Disease:  - neg infectious disease ROS       Malignancy:      - no malignancy   Other:    - neg other ROS                 Physical Exam  Normal systems: cardiovascular, pulmonary and dental    Airway   Mallampati: II  TM distance: >3 FB  Neck ROM: full    Dental     Cardiovascular       Pulmonary                     Anesthesia Plan      History & Physical Review  History and physical reviewed and following examination; no interval change.    ASA Status:  2 .    NPO Status:  > 8 hours    Plan for MAC with Propofol and Intravenous induction. Reason for MAC:  Deep or markedly invasive procedure (G8)  PONV prophylaxis:  Ondansetron (or other 5HT-3) and Dexamethasone or Solumedrol       Postoperative Care  Postoperative pain management:  IV analgesics, Oral pain medications and Multi-modal analgesia.      Consents  Anesthetic plan, risks, benefits and alternatives discussed with:  Patient..                          .

## 2017-09-29 ENCOUNTER — SURGERY (OUTPATIENT)
Age: 62
End: 2017-09-29

## 2017-09-29 ENCOUNTER — ANESTHESIA (OUTPATIENT)
Dept: GASTROENTEROLOGY | Facility: CLINIC | Age: 62
End: 2017-09-29
Payer: COMMERCIAL

## 2017-09-29 ENCOUNTER — HOSPITAL ENCOUNTER (OUTPATIENT)
Facility: CLINIC | Age: 62
Discharge: HOME | End: 2017-09-29
Attending: SURGERY | Admitting: SURGERY
Payer: COMMERCIAL

## 2017-09-29 VITALS
HEIGHT: 63 IN | BODY MASS INDEX: 31.54 KG/M2 | OXYGEN SATURATION: 98 % | DIASTOLIC BLOOD PRESSURE: 88 MMHG | SYSTOLIC BLOOD PRESSURE: 112 MMHG | WEIGHT: 178 LBS | HEART RATE: 65 BPM | RESPIRATION RATE: 16 BRPM | TEMPERATURE: 98.4 F

## 2017-09-29 LAB
COLONOSCOPY: NORMAL
UPPER GI ENDOSCOPY: NORMAL

## 2017-09-29 PROCEDURE — 45385 COLONOSCOPY W/LESION REMOVAL: CPT | Performed by: SURGERY

## 2017-09-29 PROCEDURE — 37000009 ZZH ANESTHESIA TECHNICAL FEE, EACH ADDTL 15 MIN: Performed by: SURGERY

## 2017-09-29 PROCEDURE — 37000008 ZZH ANESTHESIA TECHNICAL FEE, 1ST 30 MIN: Performed by: SURGERY

## 2017-09-29 PROCEDURE — 25000128 H RX IP 250 OP 636: Performed by: SURGERY

## 2017-09-29 PROCEDURE — 43239 EGD BIOPSY SINGLE/MULTIPLE: CPT | Performed by: SURGERY

## 2017-09-29 PROCEDURE — 88305 TISSUE EXAM BY PATHOLOGIST: CPT | Mod: 26 | Performed by: SURGERY

## 2017-09-29 PROCEDURE — 25000125 ZZHC RX 250: Performed by: NURSE ANESTHETIST, CERTIFIED REGISTERED

## 2017-09-29 PROCEDURE — 88305 TISSUE EXAM BY PATHOLOGIST: CPT | Performed by: SURGERY

## 2017-09-29 PROCEDURE — 45385 COLONOSCOPY W/LESION REMOVAL: CPT | Mod: PT | Performed by: SURGERY

## 2017-09-29 PROCEDURE — 25000128 H RX IP 250 OP 636: Performed by: NURSE ANESTHETIST, CERTIFIED REGISTERED

## 2017-09-29 PROCEDURE — 25000125 ZZHC RX 250: Performed by: SURGERY

## 2017-09-29 RX ORDER — ONDANSETRON 2 MG/ML
4 INJECTION INTRAMUSCULAR; INTRAVENOUS
Status: DISCONTINUED | OUTPATIENT
Start: 2017-09-29 | End: 2017-09-29 | Stop reason: HOSPADM

## 2017-09-29 RX ORDER — SODIUM CHLORIDE, SODIUM LACTATE, POTASSIUM CHLORIDE, CALCIUM CHLORIDE 600; 310; 30; 20 MG/100ML; MG/100ML; MG/100ML; MG/100ML
INJECTION, SOLUTION INTRAVENOUS CONTINUOUS
Status: DISCONTINUED | OUTPATIENT
Start: 2017-09-29 | End: 2017-09-29 | Stop reason: HOSPADM

## 2017-09-29 RX ORDER — PROPOFOL 10 MG/ML
INJECTION, EMULSION INTRAVENOUS CONTINUOUS PRN
Status: DISCONTINUED | OUTPATIENT
Start: 2017-09-29 | End: 2017-09-29

## 2017-09-29 RX ORDER — LIDOCAINE HYDROCHLORIDE 10 MG/ML
INJECTION, SOLUTION INFILTRATION; PERINEURAL PRN
Status: DISCONTINUED | OUTPATIENT
Start: 2017-09-29 | End: 2017-09-29

## 2017-09-29 RX ORDER — PROPOFOL 10 MG/ML
INJECTION, EMULSION INTRAVENOUS PRN
Status: DISCONTINUED | OUTPATIENT
Start: 2017-09-29 | End: 2017-09-29

## 2017-09-29 RX ORDER — LIDOCAINE 40 MG/G
CREAM TOPICAL
Status: DISCONTINUED | OUTPATIENT
Start: 2017-09-29 | End: 2017-09-29 | Stop reason: HOSPADM

## 2017-09-29 RX ADMIN — SODIUM CHLORIDE, POTASSIUM CHLORIDE, SODIUM LACTATE AND CALCIUM CHLORIDE: 600; 310; 30; 20 INJECTION, SOLUTION INTRAVENOUS at 08:16

## 2017-09-29 RX ADMIN — LIDOCAINE HYDROCHLORIDE 50 MG: 10 INJECTION, SOLUTION INFILTRATION; PERINEURAL at 08:54

## 2017-09-29 RX ADMIN — BENZOCAINE 3 SPRAY: 220 SPRAY, METERED PERIODONTAL at 08:55

## 2017-09-29 RX ADMIN — PROPOFOL 100 MG: 10 INJECTION, EMULSION INTRAVENOUS at 08:54

## 2017-09-29 RX ADMIN — PROPOFOL 150 MCG/KG/MIN: 10 INJECTION, EMULSION INTRAVENOUS at 08:54

## 2017-09-29 RX ADMIN — LIDOCAINE HYDROCHLORIDE 1 ML: 10 INJECTION, SOLUTION EPIDURAL; INFILTRATION; INTRACAUDAL; PERINEURAL at 08:16

## 2017-09-29 ASSESSMENT — ENCOUNTER SYMPTOMS: DYSRHYTHMIAS: 1

## 2017-09-29 NOTE — H&P
"62 year old year old male here for EGD and colonoscopy for abdominal pain / bloating and probable GERD.  He has never had a colonoscopy..    Patient Active Problem List   Diagnosis     PAF (paroxysmal atrial fibrillation) (H)     Gastroesophageal reflux disease with esophagitis     Essential hypertension     Bowel incontinence     Arthritis of hip       History reviewed. No pertinent past medical history.    Past Surgical History:   Procedure Laterality Date     ANGIOGRAM      unsure of results     [facility]         [facility]    No current outpatient prescriptions on file.       Allergies   Allergen Reactions     Nka [No Known Allergies]        Pt reports that he has quit smoking. He does not have any smokeless tobacco history on file. He reports that he does not drink alcohol or use illicit drugs.    Exam:  BP (!) 130/96  Temp 98.4  F (36.9  C) (Oral)  Resp 16  Ht 1.6 m (5' 3\")  Wt 80.7 kg (178 lb)  SpO2 98%  BMI 31.53 kg/m2    Awake, Alert OX3  Lungs - CTA bilaterally  CV - RRR, no murmurs, distal pulses intact  Abd - soft, non-distended, non-tender, +BS  Extr - No cyanosis or edema    A/P 62 year old year old male in need of colonoscopy for  EGD and colonoscopy for abdominal pain / bloating and probable GERD.  Risks, benefits, alternatives, and complications were discussed including the possibility of perforation and the patient agreed to proceed.      Fredi Leigh MD, FACS   "

## 2017-09-29 NOTE — ANESTHESIA CARE TRANSFER NOTE
Patient: Odilon Perea    Procedure(s):  Colonoscopy & Gastroscopy - Wound Class: II-Clean Contaminated   - Wound Class: II-Clean Contaminated    Diagnosis: bowel incontinence, diarrhea, REFLUX  Diagnosis Additional Information: No value filed.    Anesthesia Type:   MAC     Note:  Airway :Room Air  Patient transferred to:Phase II        Vitals: (Last set prior to Anesthesia Care Transfer)    CRNA VITALS  9/29/2017 0901 - 9/29/2017 0931      9/29/2017             Pulse: 58    SpO2: 95 %                Electronically Signed By: JACKELINE Souza CRNA  September 29, 2017  9:31 AM

## 2017-09-29 NOTE — ANESTHESIA POSTPROCEDURE EVALUATION
Patient: Odilon Perea    Procedure(s):  Colonoscopy & Gastroscopy - Wound Class: II-Clean Contaminated   - Wound Class: II-Clean Contaminated    Diagnosis:bowel incontinence, diarrhea, REFLUX  Diagnosis Additional Information: No value filed.    Anesthesia Type:  MAC    Note:  Anesthesia Post Evaluation    Patient location during evaluation: Bedside  Patient participation: Able to fully participate in evaluation  Level of consciousness: awake and alert  Pain management: adequate  Airway patency: patent  Cardiovascular status: acceptable  Respiratory status: acceptable  Hydration status: acceptable  PONV: none     Anesthetic complications: None          Last vitals:  Vitals:    09/29/17 0735   BP: (!) 130/96   Resp: 16   Temp: 36.9  C (98.4  F)   SpO2: 98%         Electronically Signed By: JACKELINE Souza CRNA  September 29, 2017  9:31 AM

## 2017-09-29 NOTE — BRIEF OP NOTE
[facility]   Brief Operative Note    Pre-operative diagnosis: bowel incontinence, diarrhea, REFLUX   Post-operative diagnosis normal EGD, Single colon polyp   Procedure: Procedure(s):  Colonoscopy & Gastroscopy - Wound Class: II-Clean Contaminated   - Wound Class: II-Clean Contaminated   Surgeon(s): Surgeon(s) and Role:  Panel 1:     * Fredi Leigh MD - Primary    Panel 2:     * Fredi Leigh MD - Primary   Estimated blood loss: * No values recorded between 9/29/2017 12:00 AM and 9/29/2017  9:23 AM *    Specimens:   ID Type Source Tests Collected by Time Destination   A : Stomach Antrum Tissue Stomach, Antrum SURGICAL PATHOLOGY EXAM Fredi Leigh MD 9/29/2017  8:59 AM    B : 20 cm Polyp Colon SURGICAL PATHOLOGY EXAM Fredi Leigh MD 9/29/2017  9:19 AM       Findings: 1. Normal Esophagus  2. Normal Stomach - biopsied for h.pylori  3. Normal Duodenum  4. Diminutive polyp at 20 cm - removed  5. Colon otherwise normal.

## 2017-10-02 LAB — COPATH REPORT: NORMAL

## 2017-10-03 ENCOUNTER — HOSPITAL ENCOUNTER (OUTPATIENT)
Dept: CARDIOLOGY | Facility: CLINIC | Age: 62
Discharge: HOME | End: 2017-10-03
Attending: INTERNAL MEDICINE | Admitting: INTERNAL MEDICINE
Payer: COMMERCIAL

## 2017-10-03 ENCOUNTER — OFFICE VISIT (OUTPATIENT)
Dept: CARDIOLOGY | Facility: CLINIC | Age: 62
End: 2017-10-03
Attending: INTERNAL MEDICINE
Payer: COMMERCIAL

## 2017-10-03 VITALS
SYSTOLIC BLOOD PRESSURE: 130 MMHG | WEIGHT: 182 LBS | OXYGEN SATURATION: 99 % | BODY MASS INDEX: 32.24 KG/M2 | DIASTOLIC BLOOD PRESSURE: 80 MMHG | HEART RATE: 65 BPM

## 2017-10-03 DIAGNOSIS — I47.10 PAROXYSMAL SUPRAVENTRICULAR TACHYCARDIA (H): ICD-10-CM

## 2017-10-03 DIAGNOSIS — I48.0 PAROXYSMAL ATRIAL FIBRILLATION (H): ICD-10-CM

## 2017-10-03 DIAGNOSIS — I48.0 PAROXYSMAL ATRIAL FIBRILLATION (H): Primary | ICD-10-CM

## 2017-10-03 PROCEDURE — 99214 OFFICE O/P EST MOD 30 MIN: CPT | Mod: 25 | Performed by: INTERNAL MEDICINE

## 2017-10-03 PROCEDURE — 0296T ZIO PATCH HOLTER: CPT

## 2017-10-03 PROCEDURE — 0298T ZZC EXT ECG > 48HR TO 21 DAY REVIEW AND INTERPRETATN: CPT | Performed by: INTERNAL MEDICINE

## 2017-10-03 RX ORDER — METOPROLOL SUCCINATE 25 MG/1
50 TABLET, EXTENDED RELEASE ORAL DAILY
Qty: 90 TABLET | Refills: 1 | COMMUNITY
Start: 2017-10-03 | End: 2017-10-11

## 2017-10-03 NOTE — LETTER
10/3/2017    Laila Back, JACKELINE [facility] [place]   [address]   [place] [address]    RE: Odilon Perea       Dear Colleague,    I had the pleasure of seeing Odilon RAY Eliazar in the [facility].    REASON FOR VISIT:  Evaluation of paroxysmal AFib.      Mr. Perea is a pleasant 62-year-old gentleman with history of hypertension and paroxysmal AFib who is here for evaluation.      The patient only speaks [place] and is originally from [place].  Apparently in 2009 he was admitted in the hospital in [place].  He underwent a stress test which was abnormal and finally had a coronary angiography which was within normal limits.  Hospital stay was complicated by AFib; however, we do not have much records of that admission.  He was referred to me in March of this year for evaluation of AFib.  At that time, we placed a Zio Patch monitor which showed episodes of paroxysmal SVT, however, no true atrial fibrillation.  Due to the episodes of paroxysmal A-tach, I recommended him to start a beta blocker and he is here today for evaluation.      At the moment, he is doing well.  He denies any cardiovascular symptoms such as chest pain or palpitation; however, he admits having dyspnea on minimal exertion, which seems to be his baseline.      On physical exam, he seems to be in atrial fibrillation.  I reviewed previous EKG that was done on 09/25 which was reported as sinus rhythm; however, when I look at the EKG, it is compatible with atypical flutter with 2:1.      Echocardiogram obtained in 03/2017 showed normal LV function with EF of 50%-55%.  No significant valvular disease was noticed.     Outpatient Encounter Prescriptions as of 10/3/2017   Medication Sig Dispense Refill     metoprolol (TOPROL-XL) 25 MG 24 hr tablet Take 2 tablets (50 mg) by mouth daily 90 tablet 1     Acetaminophen (TYLENOL PO)        naproxen (NAPROSYN) 500 MG tablet TAKE ONE TABLET BY MOUTH TWICE A DAY WITH  MEALS 180 tablet 0     hydrochlorothiazide (HYDRODIURIL) 25 MG tablet Take 1 tablet (25 mg) by mouth daily 90 tablet 1     omeprazole (PRILOSEC) 20 MG CR capsule Take 1 capsule (20 mg) by mouth daily 90 capsule 1     [DISCONTINUED] metoprolol (TOPROL-XL) 25 MG 24 hr tablet Take 2 tablets (50 mg) by mouth daily 90 tablet 1     [DISCONTINUED] metoprolol (TOPROL-XL) 25 MG 24 hr tablet Take 1 tablet (25 mg) by mouth daily 90 tablet 1     No facility-administered encounter medications on file as of 10/3/2017.       ASSESSMENT AND PLAN:   1.  Atrial fibrillation.  He seems to be back in AFib.  Heart rate seems to be elevated here today.  Since he is minimally symptomatic with AFib, I recommend increasing metoprolol to 50 mg a day.  I also recommended a Zio Patch monitor for 3 days to evaluate heart rates.  Depending on monitor results, we will make a decision about rate versus rhythm control strategy.   2.  Embolic prevention.  CHADS-VASc score of 1.  He is currently taking aspirin.   3.  Hypertension.  Blood pressure is well controlled.     Again, thank you for allowing me to participate in the care of your patient.      Sincerely,    Misha Hebert MD     [facility]

## 2017-10-03 NOTE — PROGRESS NOTES
[id number]    Electrophysiology/ Clinic Note         H&P and Plan:           Misha Hebert MD    Physical Exam:  Vitals: /80  Pulse 65  Wt 82.6 kg (182 lb)  SpO2 99%  BMI 32.24 kg/m2    Constitutional:  AAO x3.  Pt is in NAD.  HEAD: normocephalic.  SKIN: Skin normal color, texture and turgor with no lesions or eruptions.  Eyes: PERRL, EOMI.  ENT:  Supple, normal JVP. No lymphadenopathy or thyroid enlargement.  Chest:  CTAB, decrease breath sound in base B/L. Rales,  Wheezing.  Cardiac:  IIR, variable sound of S1 and Normal S2.  No murmurs rubs or gallop.    Abdomen:  Normal BS.  Soft, non-tender and non-distended.  No rebound or guarding.    Extremities:  Pedious pulses palpable B/L.  No LE edema noticed.   Neurological: Strength and sensation grossly symmetric and intact throughout.       CURRENT MEDICATIONS:  Current Outpatient Prescriptions   Medication Sig Dispense Refill     metoprolol (TOPROL-XL) 25 MG 24 hr tablet Take 2 tablets (50 mg) by mouth daily 90 tablet 1     Acetaminophen (TYLENOL PO)        naproxen (NAPROSYN) 500 MG tablet TAKE ONE TABLET BY MOUTH TWICE A DAY WITH MEALS 180 tablet 0     hydrochlorothiazide (HYDRODIURIL) 25 MG tablet Take 1 tablet (25 mg) by mouth daily 90 tablet 1     omeprazole (PRILOSEC) 20 MG CR capsule Take 1 capsule (20 mg) by mouth daily 90 capsule 1     [DISCONTINUED] metoprolol (TOPROL-XL) 25 MG 24 hr tablet Take 1 tablet (25 mg) by mouth daily 90 tablet 1       ALLERGIES     Allergies   Allergen Reactions     Nka [No Known Allergies]        PAST MEDICAL HISTORY:  No past medical history on file.    PAST SURGICAL HISTORY:  Past Surgical History:   Procedure Laterality Date     ANGIOGRAM      unsure of results     COLONOSCOPY       ESOPHAGOSCOPY, GASTROSCOPY, DUODENOSCOPY (EGD), COMBINED N/A 9/29/2017    Procedure: COMBINED ESOPHAGOSCOPY, GASTROSCOPY, DUODENOSCOPY (EGD), BIOPSY SINGLE OR MULTIPLE;;  Surgeon: Fredi Leigh MD;  Location: [facility]  HISTORY:  Family History   Problem Relation Age of Onset     Ovarian Cancer Mother      Alzheimer Disease Father      DIABETES Brother      3 brothers with diabetes     Other - See Comments Sister      has an illness unsure what it is.        SOCIAL HISTORY:  Social History     Social History     Marital status: Single     Spouse name: N/A     Number of children: N/A     Years of education: N/A     Social History Main Topics     Smoking status: Former Smoker     Smokeless tobacco: None     Alcohol use No     Drug use: No     Sexual activity: Yes     Partners: Male     Other Topics Concern     Parent/Sibling W/ Cabg, Mi Or Angioplasty Before 65f 55m? No     Social History Narrative       Review of Systems:  Skin:  Positive for bruising;scaling   Eyes:  Positive for visual blurring  ENT:  Positive for hearing loss;tinnitus  Respiratory:  Positive for dyspnea on exertion;cough  Cardiovascular:    Positive for;chest pain;edema;fatigue;dizziness  Gastroenterology: Positive for reflux;excessive gas or bloating  Genitourinary:  Positive for urgency;urinary frequency  Musculoskeletal:  Positive for arthritis  Neurologic:  Positive for headaches;numbness or tingling of hands;tremors  Psychiatric:  Positive for depression  Heme/Lymph/Imm:  Negative    Endocrine:  Negative        Recent Lab Results:  LIPID RESULTS:  Lab Results   Component Value Date    CHOL 145 02/16/2017    HDL 39 (L) 02/16/2017    LDL 81 02/16/2017    TRIG 123 02/16/2017       LIVER ENZYME RESULTS:  Lab Results   Component Value Date    AST 31 07/06/2017    ALT 31 07/06/2017       CBC RESULTS:  Lab Results   Component Value Date    WBC 7.1 07/06/2017    RBC 4.93 07/06/2017    HGB 15.3 07/06/2017    HCT 44.9 07/06/2017    MCV 91 07/06/2017    MCH 31.0 07/06/2017    MCHC 34.1 07/06/2017    RDW 12.7 07/06/2017     07/06/2017       BMP RESULTS:  Lab Results   Component Value Date     07/06/2017    POTASSIUM 3.2 (L) 07/06/2017    CHLORIDE 104  2017    CO2 27 2017    ANIONGAP 7 2017     (H) 2017    BUN 20 2017    CR 0.92 2017    GFRESTIMATED 83 2017    GFRESTBLACK >90   GFR Calc   2017    MISHA 8.8 2017        A1C RESULTS:  No results found for: A1C    INR RESULTS:  No results found for: INR      ECHOCARDIOGRAM  Recent Results (from the past 8760 hour(s))   Echocardiogram    Narrative    [id number]  [id number]  [id number]  [id number]^LUX^WALKER^MARGARITA           [facility]  Echocardiography Laboratory  [address] [place] [address]        Name: SEGUNDO RIVERA  MRN: [id number]  : 1955  Study Date: 2017 01:59 PM  Age: 62 yrs  Gender: Male  Patient Location: [facility]  Reason For Study: Paroxysmal atrial fibrillation  Ordering Physician: WALKER WASSERMAN  Referring Physician: Laila Back  Performed By: Taty Ortiz RDCS     BSA: 1.8 m2  Height: 62 in  Weight: 172 lb  BP: 157/101 mmHg  _____________________________________________________________________________  __        Procedure  Complete Echo Adult.  _____________________________________________________________________________  __        Interpretation Summary     Left ventricular systolic function is low normal.The visual ejection fraction  is estimated at 50-55%.The transmitral spectral Doppler flow pattern is  suggestive of pseudonormalization.E by E prime ratio is greater than 15, that  likely suggests increased left ventricular filling pressures.  The right ventricular systolic function is normal.  No significnat valvular abnormalities.  _____________________________________________________________________________  __        Left Ventricle  The left ventricle is normal in size. There is normal left ventricular wall  thickness. The visual ejection fraction is estimated at 50-55%. Left  ventricular systolic function is low normal. The transmitral spectral Doppler  flow pattern is suggestive of  pseudonormalization. E by E prime ratio is  greater than 15, that likely suggests increased left ventricular filling  pressures. No regional wall motion abnormalities noted.     Right Ventricle  The right ventricle is normal size. The right ventricular systolic function is  normal.     Atria  Normal left atrial size. Right atrial size is normal. There is no color  Doppler evidence of an atrial shunt.     Mitral Valve  There is trace mitral regurgitation.        Tricuspid Valve  There is trace tricuspid regurgitation.     Aortic Valve  The aortic valve is trileaflet. No aortic regurgitation is present. No aortic  stenosis is present.     Pulmonic Valve  The pulmonic valve is not well visualized. There is trace pulmonic valvular  regurgitation. There is no pulmonic valvular stenosis.     Vessels  The aortic root is normal size. The IVC is normal in size and reactivity with  respiration, suggesting normal central venous pressure.     Pericardium  There is no pericardial effusion.        Rhythm  sinus.  _____________________________________________________________________________  __  MMode/2D Measurements & Calculations     IVSd: 0.94 cm  LVIDd: 5.1 cm  LVIDs: 3.9 cm  LVPWd: 0.84 cm  FS: 24.3 %  EDV(Teich): 123.4 ml  ESV(Teich): 64.0 ml  LV mass(C)d: 161.3 grams  Ao root diam: 3.1 cm        Doppler Measurements & Calculations  MV E max yuan: 94.1 cm/sec  MV A max yuan: 106.6 cm/sec  MV E/A: 0.88  MV dec slope: 559.7 cm/sec2  MV dec time: 0.17 sec  Lateral E/e': 11.9  Medial E/e': 19.3              _____________________________________________________________________________  __        Report approved by: Marci Mendez 03/23/2017 03:36 PM            Orders Placed This Encounter   Procedures     Zio Patch Monitor     Orders Placed This Encounter   Medications     metoprolol (TOPROL-XL) 25 MG 24 hr tablet     Sig: Take 2 tablets (50 mg) by mouth daily     Dispense:  90 tablet     Refill:  1     Medications  Discontinued During This Encounter   Medication Reason     metoprolol (TOPROL-XL) 25 MG 24 hr tablet Reorder         Encounter Diagnoses   Name Primary?     Paroxysmal atrial fibrillation (H) Yes     Paroxysmal supraventricular tachycardia (H)          CC  Misha Hebert MD  [address]  [place] [id number]

## 2017-10-03 NOTE — MR AVS SNAPSHOT
After Visit Summary   10/3/2017    Odilon Perea    MRN: [id number]           Patient Information     Date Of Birth          1955        Visit Information        Provider Department      10/3/2017 1:15 PM Misha Hebert MD; [employer] [facility] PHYSICIAN HEART AT [facility]        Today's Diagnoses     Paroxysmal atrial fibrillation (H)    -  1    Paroxysmal supraventricular tachycardia (H)          Care Instructions    Thank you for your  Heart Care visit today. Your provider has recommended the following:  Medication Changes:  Increase Metoprolol to 50 mg 1 tablet a day (May take 2 tablets of the 25 mg a day).  Recommendations:  3 day Zio patch next available.  Follow-up:  Will call with results and follow up instructions.  We kindly ask that you call cardiology scheduling at [phone] three months prior to requested revisit date to schedule future cardiology appointments.  Reminder:  1. Please bring in your current medication list or your medication, over the counter supplements and vitamin bottles as we will review these at each office visit.               [facility]~[address]~[place], [place]~[address]  Questions about your visit today?  Call your Cardiology Clinic RN's-Shellie Milligan and/or Jolanta Paulson at [phone].                Follow-ups after your visit        Future tests that were ordered for you today     Open Future Orders        Priority Expected Expires Ordered    Zio Patch Monitor Routine 10/10/2017 10/3/2018 10/3/2017            Who to contact     If you have questions or need follow up information about today's clinic visit or your schedule please contact [facility] PHYSICIAN HEART AT [facility] directly at [phone].  Normal or non-critical lab and imaging results will be communicated to you by MyChart, letter or phone within 4 business days after the  "clinic has received the results. If you do not hear from us within 7 days, please contact the clinic through [employer] or phone. If you have a critical or abnormal lab result, we will notify you by phone as soon as possible.  Submit refill requests through [employer] or call your pharmacy and they will forward the refill request to us. Please allow 3 business days for your refill to be completed.          Additional Information About Your Visit        [employer] Information     [employer] lets you send messages to your doctor, view your test results, renew your prescriptions, schedule appointments and more. To sign up, go to [email or web address] . Click on \"Log in\" on the left side of the screen, which will take you to the Welcome page. Then click on \"Sign up Now\" on the right side of the page.     You will be asked to enter the access code listed below, as well as some personal information. Please follow the directions to create your username and password.     Your access code is: [email or web address]  Expires: 10/4/2017  3:01 PM     Your access code will  in 90 days. If you need help or a new code, please call your [facility] clinic or [phone].        Care EveryWhere ID     This is your Care EveryWhere ID. This could be used by other organizations to access your [facility] medical records  [id number]        Your Vitals Were     Pulse Pulse Oximetry BMI (Body Mass Index)             65 99% 32.24 kg/m2          Blood Pressure from Last 3 Encounters:   10/03/17 130/80   17 112/88   17 (!) 148/96    Weight from Last 3 Encounters:   10/03/17 82.6 kg (182 lb)   17 80.7 kg (178 lb)   17 78.5 kg (173 lb)              We Performed the Following     Follow-Up with Electrophysiologist          Today's Medication Changes          These changes are accurate as of: 10/3/17  1:49 PM.  If you have any questions, ask your nurse or doctor.               These medicines have changed or have updated prescriptions.  "       Dose/Directions    metoprolol 25 MG 24 hr tablet   Commonly known as:  TOPROL-XL   This may have changed:  how much to take   Used for:  Paroxysmal supraventricular tachycardia (H)   Changed by:  Misha Hebert MD        Dose:  50 mg   Take 2 tablets (50 mg) by mouth daily   Quantity:  90 tablet   Refills:  1                Primary Care Provider Office Phone # Fax #    Laila Back, APRN [phone]       [facility] [address]   [place] [address]        Equal Access to Services     [facility] AH: Hadii aad ku hadasho Soomaali, waaxda luqadaha, qaybta kaalmada adeegyada, waxay idiin haysriram aderichard khararossi paz . So [facility] [phone].    ATENCIÓN: Si habla español, tiene a purvis disposición servicios gratuitos de asistencia lingüística. [facility] [phone].    We comply with applicable federal civil rights laws and [place] laws. We do not discriminate on the basis of race, color, national origin, age, disability, sex, sexual orientation, or gender identity.            Thank you!     Thank you for choosing [facility] PHYSICIAN HEART AT [facility]  for your care. Our goal is always to provide you with excellent care. Hearing back from our patients is one way we can continue to improve our services. Please take a few minutes to complete the written survey that you may receive in the mail after your visit with us. Thank you!             Your Updated Medication List - Protect others around you: Learn how to safely use, store and throw away your medicines at www.disposemymeds.org.          This list is accurate as of: 10/3/17  1:49 PM.  Always use your most recent med list.                   Brand Name Dispense Instructions for use Diagnosis    hydrochlorothiazide 25 MG tablet    HYDRODIURIL    90 tablet    Take 1 tablet (25 mg) by mouth daily    Benign essential hypertension       metoprolol 25 MG 24 hr tablet    TOPROL-XL    90 tablet     Take 2 tablets (50 mg) by mouth daily    Paroxysmal supraventricular tachycardia (H)       naproxen 500 MG tablet    NAPROSYN    180 tablet    TAKE ONE TABLET BY MOUTH TWICE A DAY WITH MEALS    Multiple joint pain       omeprazole 20 MG CR capsule    priLOSEC    90 capsule    Take 1 capsule (20 mg) by mouth daily    Gastroesophageal reflux disease with esophagitis       TYLENOL PO

## 2017-10-03 NOTE — PATIENT INSTRUCTIONS
Thank you for your [facility] visit today. Your provider has recommended the following:  Medication Changes:  Increase Metoprolol to 50 mg 1 tablet a day (May take 2 tablets of the 25 mg a day).  Recommendations:  3 day Zio patch next available.  Follow-up:  Will call with results and follow up instructions.  We kindly ask that you call cardiology scheduling at [phone] three months prior to requested revisit date to schedule future cardiology appointments.  Reminder:  1. Please bring in your current medication list or your medication, over the counter supplements and vitamin bottles as we will review these at each office visit.               [facility]  [facility]~[address]~[place], [place]~[address]  Questions about your visit today?  Call your Cardiology Clinic RN's-Shellie Milligan and/or Jolanta Paulson at [phone].

## 2017-10-03 NOTE — PROGRESS NOTES
REASON FOR VISIT:  Evaluation of paroxysmal AFib.      HISTORY OF PRESENT ILLNESS:  Mr. Perea is a pleasant 62-year-old gentleman with history of hypertension and paroxysmal AFib who is here for evaluation.      The patient only speaks [place] and is originally from [place].  Apparently in 2009 he was admitted in the hospital in [place].  He underwent a stress test which was abnormal and finally had a coronary angiography which was within normal limits.  Hospital stay was complicated by AFib; however, we do not have much records of that admission.  He was referred to me in March of this year for evaluation of AFib.  At that time, we placed a Zio Patch monitor which showed episodes of paroxysmal SVT, however, no true atrial fibrillation.  Due to the episodes of paroxysmal A-tach, I recommended him to start a beta blocker and he is here today for evaluation.      At the moment, he is doing well.  He denies any cardiovascular symptoms such as chest pain or palpitation; however, he admits having dyspnea on minimal exertion, which seems to be his baseline.      On physical exam, he seems to be in atrial fibrillation.  I reviewed previous EKG that was done on 09/25 which was reported as sinus rhythm; however, when I look at the EKG, it is compatible with atypical flutter with 2:1.      Echocardiogram obtained in 03/2017 showed normal LV function with EF of 50%-55%.  No significant valvular disease was noticed.      ASSESSMENT AND PLAN:   1.  Atrial fibrillation.  He seems to be back in AFib.  Heart rate seems to be elevated here today.  Since he is minimally symptomatic with AFib, I recommend increasing metoprolol to 50 mg a day.  I also recommended a Zio Patch monitor for 3 days to evaluate heart rates.  Depending on monitor results, we will make a decision about rate versus rhythm control strategy.   2.  Embolic prevention.  CHADS-VASc score of 1.  He is currently taking aspirin.   3.  Hypertension.  Blood pressure is  well controlled.         WALKER WASSERMAN MD             D: 10/03/2017 13:56   T: 10/03/2017 15:28   MT: ELEUTERIO      Name:     SEGUNDO RIVERA   MRN:      [id number]        Account:      [id number]   :      1955           Service Date: 10/03/2017      Document: [id number]

## 2017-10-11 RX ORDER — METOPROLOL SUCCINATE 25 MG/1
50 TABLET, EXTENDED RELEASE ORAL DAILY
Qty: 90 TABLET | Refills: 1 | Status: SHIPPED | OUTPATIENT
Start: 2017-10-11 | End: 2018-02-27

## 2017-10-13 ENCOUNTER — OFFICE VISIT (OUTPATIENT)
Dept: FAMILY MEDICINE | Facility: CLINIC | Age: 62
End: 2017-10-13
Payer: COMMERCIAL

## 2017-10-13 VITALS
DIASTOLIC BLOOD PRESSURE: 104 MMHG | HEIGHT: 63 IN | SYSTOLIC BLOOD PRESSURE: 150 MMHG | HEART RATE: 66 BPM | BODY MASS INDEX: 32.47 KG/M2 | WEIGHT: 183.25 LBS

## 2017-10-13 DIAGNOSIS — I48.0 PAROXYSMAL ATRIAL FIBRILLATION (H): Primary | ICD-10-CM

## 2017-10-13 DIAGNOSIS — M54.2 CERVICALGIA: ICD-10-CM

## 2017-10-13 DIAGNOSIS — I10 ESSENTIAL HYPERTENSION: ICD-10-CM

## 2017-10-13 DIAGNOSIS — M54.50 ACUTE BILATERAL LOW BACK PAIN WITHOUT SCIATICA: ICD-10-CM

## 2017-10-13 LAB — ERYTHROCYTE [SEDIMENTATION RATE] IN BLOOD BY WESTERGREN METHOD: 6 MM/H (ref 0–20)

## 2017-10-13 PROCEDURE — 86140 C-REACTIVE PROTEIN: CPT | Performed by: NURSE PRACTITIONER

## 2017-10-13 PROCEDURE — 36415 COLL VENOUS BLD VENIPUNCTURE: CPT | Performed by: NURSE PRACTITIONER

## 2017-10-13 PROCEDURE — 99214 OFFICE O/P EST MOD 30 MIN: CPT | Performed by: NURSE PRACTITIONER

## 2017-10-13 PROCEDURE — 86200 CCP ANTIBODY: CPT | Performed by: NURSE PRACTITIONER

## 2017-10-13 PROCEDURE — 86431 RHEUMATOID FACTOR QUANT: CPT | Performed by: NURSE PRACTITIONER

## 2017-10-13 PROCEDURE — 85652 RBC SED RATE AUTOMATED: CPT | Performed by: NURSE PRACTITIONER

## 2017-10-13 RX ORDER — LISINOPRIL 20 MG/1
20 TABLET ORAL DAILY
Qty: 30 TABLET | Refills: 1 | Status: SHIPPED | OUTPATIENT
Start: 2017-10-13 | End: 2017-12-12

## 2017-10-13 RX ORDER — HYDROCODONE BITARTRATE AND ACETAMINOPHEN 5; 325 MG/1; MG/1
1 TABLET ORAL
Qty: 30 TABLET | Refills: 0 | Status: SHIPPED | OUTPATIENT
Start: 2017-10-13

## 2017-10-13 ASSESSMENT — ENCOUNTER SYMPTOMS
COUGH: 0
CONSTIPATION: 0
ARTHRALGIAS: 1
DIZZINESS: 0
FATIGUE: 0
HEADACHES: 0
WHEEZING: 0
ABDOMINAL PAIN: 0
JOINT SWELLING: 0
NUMBNESS: 0
LIGHT-HEADEDNESS: 0
SORE THROAT: 0
WEAKNESS: 1
RHINORRHEA: 0
NECK PAIN: 1
SINUS PRESSURE: 0
PALPITATIONS: 0
DIARRHEA: 0
FREQUENCY: 0
DYSURIA: 0
BACK PAIN: 1
SHORTNESS OF BREATH: 0

## 2017-10-13 NOTE — PATIENT INSTRUCTIONS
Take over the counter Aspirin 325 mg orally daily.     Start lisinopril for blood pressure, continue metoprolol as well. Plan to follow up in 1 month for blood pressure recheck    Only take 1 naproxen twice per day.     Pain medication at night only. Can cause constipation so drink a lot of water and eat high fiber foods.     Set up for PHYSICAL THERAPY , MRI of neck and lower back.

## 2017-10-13 NOTE — NURSING NOTE
"Chief Complaint   Patient presents with     Arthritis       Initial BP (!) 150/104  Pulse 66  Ht 5' 3\" (1.6 m)  Wt 183 lb 4 oz (83.1 kg)  BMI 32.46 kg/m2 Estimated body mass index is 32.46 kg/(m^2) as calculated from the following:    Height as of this encounter: 5' 3\" (1.6 m).    Weight as of this encounter: 183 lb 4 oz (83.1 kg).  Medication Reconciliation: complete  "
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
## 2017-10-13 NOTE — LETTER
[facility]  [address]  [facility] [address]  Phone: [phone]      10/18/2017     Odilon Perea  [address] [address] [address]      Dear Odilon:    Thank you for allowing me to participate in your care. Your recent test results were reviewed and listed below.      All of your lab results are normal. Please call the clinic if you have any other questions or concerns, take care!  Results for orders placed or performed in visit on 10/13/17   Cyclic Citrullinated Peptide Antibody IgG   Result Value Ref Range    Cyclic Citrullinated Peptide Antibody, IgG 1 <7 U/mL   Rheumatoid factor   Result Value Ref Range    Rheumatoid Factor <20 <20 IU/mL   Erythrocyte sedimentation rate auto   Result Value Ref Range    Sed Rate 6 0 - 20 mm/h   CRP inflammation   Result Value Ref Range    CRP Inflammation <2.9 0.0 - 8.0 mg/L     Thank you for choosing [facility]. As a result, please continue with the treatment plan discussed in the office. Return as discussed or sooner if symptoms worsen or fail to improve. If you have any further questions or concerns, please do not hesitate to contact us.      Sincerely,        JACKELINE Ying CNP [facility]

## 2017-10-13 NOTE — MR AVS SNAPSHOT
After Visit Summary   10/13/2017    Odilon Perea    MRN: [id number]           Patient Information     Date Of Birth          1955        Visit Information        Provider Department      10/13/2017 3:30 PM Laila Back, JACKELINE CNP; [place] [employer] [facility]        Today's Diagnoses     Paroxysmal atrial fibrillation (H)    -  1    Cervicalgia        Acute bilateral low back pain without sciatica        Essential hypertension          Care Instructions    Take over the counter Aspirin 325 mg orally daily.     Start lisinopril for blood pressure, continue metoprolol as well. Plan to follow up in 1 month for blood pressure recheck    Only take 1 naproxen twice per day.     Pain medication at night only. Can cause constipation so drink a lot of water and eat high fiber foods.     Set up for PHYSICAL THERAPY , MRI of neck and lower back.           Follow-ups after your visit        Additional Services     [facility] PT, HAND, AND CHIROPRACTIC REFERRAL       **This order will print in the [facility] Scheduling Office**    Physical Therapy, Hand Therapy and Chiropractic Care are available through:    *[facility]  *[facility]  *[facility]    Call one number to schedule at any of the above locations: [phone].    Your provider has referred you to: Physical Therapy at [facility] or [facility]    Indication/Reason for Referral: Low Back Pain and Neck Pain  Onset of Illness:   Therapy Orders: Evaluate and Treat  Special Programs: None  Special Request: None    [place]      Additional Comments for the Therapist or Chiropractor:     Please be aware that coverage of these services is subject to the terms and limitations of your health insurance plan.  Call member services at your health plan with any benefit or coverage questions.      Please bring the following to your appointment:    *Your personal calendar for scheduling future  "appointments  *Comfortable clothing                  Future tests that were ordered for you today     Open Future Orders        Priority Expected Expires Ordered    MR Lumbar Spine w/o Contrast Routine  10/13/2018 10/13/2017    MR Cervical Spine w/o Contrast Routine  10/13/2018 10/13/2017            Who to contact     Normal or non-critical lab and imaging results will be communicated to you by [employer], letter or phone within 4 business days after the clinic has received the results. If you do not hear from us within 7 days, please contact the clinic through [employer] or phone. If you have a critical or abnormal lab result, we will notify you by phone as soon as possible.  Submit refill requests through [employer] or call your pharmacy and they will forward the refill request to us. Please allow 3 business days for your refill to be completed.          If you need to speak with a  for additional information , please call: [phone]           Additional Information About Your Visit        [employer] Information     [employer] lets you send messages to your doctor, view your test results, renew your prescriptions, schedule appointments and more. To sign up, go to [email or web address] . Click on \"Log in\" on the left side of the screen, which will take you to the Welcome page. Then click on \"Sign up Now\" on the right side of the page.     You will be asked to enter the access code listed below, as well as some personal information. Please follow the directions to create your username and password.     Your access code is: [email or web address]  Expires: 2018  3:41 PM     Your access code will  in 90 days. If you need help or a new code, please call your [place] clinic or [phone].        Care EveryWhere ID     This is your Care EveryWhere ID. This could be used by other organizations to access your [place] medical records  [id number]        Your Vitals Were     Pulse Height BMI (Body Mass Index)    " "         66 5' 3\" (1.6 m) 32.46 kg/m2          Blood Pressure from Last 3 Encounters:   10/13/17 (!) 150/104   10/03/17 130/80   09/29/17 112/88    Weight from Last 3 Encounters:   10/13/17 183 lb 4 oz (83.1 kg)   10/03/17 182 lb (82.6 kg)   09/29/17 178 lb (80.7 kg)              We Performed the Following     CRP inflammation     Cyclic Citrullinated Peptide Antibody IgG     Erythrocyte sedimentation rate auto     [facility] PT, HAND, AND CHIROPRACTIC REFERRAL     Rheumatoid factor          Today's Medication Changes          These changes are accurate as of: 10/13/17  3:41 PM.  If you have any questions, ask your nurse or doctor.               Start taking these medicines.        Dose/Directions    aspirin  MG EC tablet   Used for:  Paroxysmal atrial fibrillation (H)   Started by:  Laila Back APRN CNP        Dose:  325 mg   Take 1 tablet (325 mg) by mouth daily   Quantity:  90 tablet   Refills:  3       HYDROcodone-acetaminophen 5-325 MG per tablet   Commonly known as:  NORCO   Used for:  Cervicalgia, Acute bilateral low back pain without sciatica   Started by:  Laila Back APRN CNP        Dose:  1 tablet   Take 1 tablet by mouth nightly as needed for moderate to severe pain   Quantity:  30 tablet   Refills:  0       lisinopril 20 MG tablet   Commonly known as:  PRINIVIL/ZESTRIL   Used for:  Essential hypertension   Started by:  Laila Back APRN CNP        Dose:  20 mg   Take 1 tablet (20 mg) by mouth daily   Quantity:  30 tablet   Refills:  1            Where to get your medicines      These medications were sent to [facility] [place], [place] - [address]  [address], [facility] [address]     Phone:  [phone]     aspirin  MG EC tablet    lisinopril 20 MG tablet         Some of these will need a paper prescription and others can be bought over the counter.  Ask your nurse if you have questions.     Bring a paper prescription for each " of these medications     HYDROcodone-acetaminophen 5-325 MG per tablet                Primary Care Provider Office Phone # Fax #    Laila Back, JACKELINE [phone]       [facility] [address] [place] [address]        Equal Access to Services     EILEEN HAYWARD : Hadii aad ku hadasho Soomaali, waaxda luqadaha, qaybta kaalmada adeegyada, waxay vikasin hayaan ruben pazhannahrossi salgado. So [facility] [phone].    ATENCIÓN: Si habla español, tiene a purvis disposición servicios gratuitos de asistencia lingüística. Llame al [phone].    We comply with applicable federal civil rights laws and [place] laws. We do not discriminate on the basis of race, color, national origin, age, disability, sex, sexual orientation, or gender identity.            Thank you!     Thank you for choosing [facility]  for your care. Our goal is always to provide you with excellent care. Hearing back from our patients is one way we can continue to improve our services. Please take a few minutes to complete the written survey that you may receive in the mail after your visit with us. Thank you!             Your Updated Medication List - Protect others around you: Learn how to safely use, store and throw away your medicines at www.disposemymeds.org.          This list is accurate as of: 10/13/17  3:41 PM.  Always use your most recent med list.                   Brand Name Dispense Instructions for use Diagnosis    aspirin  MG EC tablet     90 tablet    Take 1 tablet (325 mg) by mouth daily    Paroxysmal atrial fibrillation (H)       hydrochlorothiazide 25 MG tablet    HYDRODIURIL    90 tablet    Take 1 tablet (25 mg) by mouth daily    Benign essential hypertension       HYDROcodone-acetaminophen 5-325 MG per tablet    NORCO    30 tablet    Take 1 tablet by mouth nightly as needed for moderate to severe pain    Cervicalgia, Acute bilateral low back pain without sciatica        lisinopril 20 MG tablet    PRINIVIL/ZESTRIL    30 tablet    Take 1 tablet (20 mg) by mouth daily    Essential hypertension       metoprolol 25 MG 24 hr tablet    TOPROL-XL    90 tablet    Take 2 tablets (50 mg) by mouth daily    Paroxysmal supraventricular tachycardia (H)       naproxen 500 MG tablet    NAPROSYN    180 tablet    TAKE ONE TABLET BY MOUTH TWICE A DAY WITH MEALS    Multiple joint pain       omeprazole 20 MG CR capsule    priLOSEC    90 capsule    Take 1 capsule (20 mg) by mouth daily    Gastroesophageal reflux disease with esophagitis       TYLENOL PO

## 2017-10-14 LAB — CRP SERPL-MCNC: <2.9 MG/L (ref 0–8)

## 2017-10-16 LAB
CCP AB SER IA-ACNC: 1 U/ML
RHEUMATOID FACT SER NEPH-ACNC: <20 IU/ML (ref 0–20)

## 2017-10-19 ENCOUNTER — HOSPITAL ENCOUNTER (OUTPATIENT)
Dept: MRI IMAGING | Facility: CLINIC | Age: 62
Discharge: HOME | End: 2017-10-19
Attending: NURSE PRACTITIONER | Admitting: NURSE PRACTITIONER
Payer: COMMERCIAL

## 2017-10-19 ENCOUNTER — HOSPITAL ENCOUNTER (OUTPATIENT)
Dept: MRI IMAGING | Facility: CLINIC | Age: 62
End: 2017-10-19
Attending: NURSE PRACTITIONER
Payer: COMMERCIAL

## 2017-10-19 DIAGNOSIS — M54.50 ACUTE BILATERAL LOW BACK PAIN WITHOUT SCIATICA: ICD-10-CM

## 2017-10-19 DIAGNOSIS — M54.2 CERVICALGIA: ICD-10-CM

## 2017-10-19 PROCEDURE — 72141 MRI NECK SPINE W/O DYE: CPT

## 2017-10-19 PROCEDURE — 72148 MRI LUMBAR SPINE W/O DYE: CPT

## 2017-10-19 NOTE — LETTER
Dear Odilon Perea,    Laila Back is out of the office and I am following up on her messages.    Your Lumbar MRI shows some mild degenerative changes that are common but nothing that suggests anything worrisome or indicating a need for injections or surgical consultation.    Your Cervical MRI shows some mild wear and tear and some slight narrowing of the nerve outlets which could cause some pain going into the arms.  This is not at the point where I'd recommend surgery or injections though.  Physical therapy may be helpful in reducing symptoms. Please let Laila Back or me know if this is something you would like to pursue.     Please contact our clinic if you have additional questions or concerns.  I will be updating Laila with this message upon her return.     Sincerely,    Errol Sanabria MD/ EMG,MA

## 2017-10-19 NOTE — LETTER
Dear Odilon Perea,    Laila Back is out of the office and I am following up on her messages.    Your lumbar MRI shows some mild degenerative changes that are common but nothing that suggests anything worrisome or indicating a need for injections or surgical consultation.  Physical therapy may be helpful. Please let JACKELINE Ying CNP or me know if this is something you would like to pursue.     Please contact our clinic if you have additional questions or concerns.  I will be updating Laila with this message upon her return.     Sincerely,    Errol Sanabria MD/ EMG,MA

## 2017-10-23 NOTE — PROGRESS NOTES
Mr. PereaLaila is out of the office and I am following up on her messages.    Your MRI shows some mild wear and tear and some slight narrowing of the nerve outlets which could cause some pain going into the arms.  This is not at the point where I'd recommend surgery or injections though. physical therapy may be helpful in reducing symptoms. Please let JACKELINE Ying CNP or me know if this is something you would like to pursue.     Please contact our clinic if you have additional questions or concerns.  I will be updating Laila with this message upon her return.     Sincerely,    Errol Sanabria MD

## 2017-10-23 NOTE — PROGRESS NOTES
Huey PereaLaila is out of the office and I am following up on her messages.    Your lumbar MRI shows some mild degenerative changes that are common but nothing that suggests anything worrisome or indicating a need for injections or surgical consultation.  physical therapy may be helpful. Please let JACKELINE Ying CNP or me know if this is something you would like to pursue.     Please contact our clinic if you have additional questions or concerns.  I will be updating Laila with this message upon her return.     Sincerely,    Errol Sanabria MD

## 2017-10-24 ENCOUNTER — TELEPHONE (OUTPATIENT)
Dept: CARDIOLOGY | Facility: CLINIC | Age: 62
End: 2017-10-24

## 2017-10-24 NOTE — TELEPHONE ENCOUNTER
----- Message from Misha Hebert MD sent at 10/23/2017  4:28 PM CDT -----  Zio patch showed paroxysmal AFib with overall good HR control.  Pt was not symptomatic. We will continue current therapy.    Please update patient on results and plan.    Thank you,    Misha Hebert MD

## 2017-10-24 NOTE — TELEPHONE ENCOUNTER
Called and informed sister of ziopatch results after reviewed by Dr Hebert. No changes at this time.

## 2017-10-25 NOTE — ADDENDUM NOTE
Encounter addended by: MAGALYS MERCADO on: 10/25/2017  3:28 PM<BR>     Actions taken: Results reviewed in IB, Letter status changed

## 2017-11-07 ENCOUNTER — TELEPHONE (OUTPATIENT)
Dept: FAMILY MEDICINE | Facility: CLINIC | Age: 62
End: 2017-11-07

## 2017-11-07 NOTE — TELEPHONE ENCOUNTER
Received PA request for Omeprazole DR 20mg Caps from the [facility]  Pharmacy Rejection Note: 76 Plan limitations Exceeded    Sig: Take 1 capsule (20 mg) by mouth daily  Disp: 30 per 30  TRACY: no    No previous PA on file for this med.    Dx: Gastroesophageal reflux disease with esophagitis [K21.0]   Rationale: Tx of Gastroesophageal reflux disease with esophagitis [K21.0]     Provided Ins: [place]  Provided Ins ID: [id number]  Provided Ins Phone # [phone]    PA submitted to [employer] via CoverMyMeds, Keycode [id number]    Eran Terry RT (r)  [facility]

## 2017-11-17 NOTE — TELEPHONE ENCOUNTER
Received response from [facility] [place]/[employer]    Response provided to the pharmacy, routed to the provider             Eran ALVA (r)  [facility]

## 2017-11-17 NOTE — TELEPHONE ENCOUNTER
Left message at patients home to return call to clinic with significant other. She said she would have him call us back. Soraya Duran RN

## 2017-11-17 NOTE — TELEPHONE ENCOUNTER
Stop omeprazole and start over the counter zantac (ranitidine) 75 mg orally twice per day     Please call or email with any additional questions or concerns  JACKELINE Ying CNP

## 2017-12-12 DIAGNOSIS — I10 ESSENTIAL HYPERTENSION: ICD-10-CM

## 2017-12-13 ENCOUNTER — HOSPITAL (OUTPATIENT)
Dept: OTHER | Age: 62
End: 2017-12-13
Attending: PHYSICIAN ASSISTANT

## 2017-12-13 LAB
ANALYZER ANC (IANC): ABNORMAL
ANION GAP SERPL CALC-SCNC: 14 MMOL/L (ref 10–20)
BASOPHILS # BLD: 0 THOUSAND/MCL (ref 0–0.3)
BASOPHILS NFR BLD: 0 %
BNP SERPL-MCNC: 171 PG/ML
BUN SERPL-MCNC: 32 MG/DL (ref 6–20)
BUN/CREAT SERPL: 29 (ref 7–25)
CALCIUM SERPL-MCNC: 8.8 MG/DL (ref 8.4–10.2)
CHLORIDE: 106 MMOL/L (ref 98–107)
CO2 SERPL-SCNC: 23 MMOL/L (ref 21–32)
CREAT SERPL-MCNC: 1.11 MG/DL (ref 0.67–1.17)
DIFFERENTIAL METHOD BLD: ABNORMAL
EOSINOPHIL # BLD: 0.2 THOUSAND/MCL (ref 0.1–0.5)
EOSINOPHIL NFR BLD: 3 %
ERYTHROCYTE [DISTWIDTH] IN BLOOD: 15.9 % (ref 11–15)
GLUCOSE SERPL-MCNC: 95 MG/DL (ref 65–99)
HEMATOCRIT: 30.7 % (ref 39–51)
HGB BLD-MCNC: 9.3 GM/DL (ref 13–17)
LARGE PLATELETS (PLTL): PRESENT
LYMPHOCYTES # BLD: 1.6 THOUSAND/MCL (ref 1–4)
LYMPHOCYTES NFR BLD: 22 %
MCH RBC QN AUTO: 22 PG (ref 26–34)
MCHC RBC AUTO-ENTMCNC: 30.3 GM/DL (ref 32–36.5)
MCV RBC AUTO: 72.7 FL (ref 78–100)
MONOCYTES # BLD: 0.7 THOUSAND/MCL (ref 0.3–0.9)
MONOCYTES NFR BLD: 10 %
NEUTROPHILS # BLD: 4.7 THOUSAND/MCL (ref 1.8–7.7)
NEUTROPHILS NFR BLD: 65 %
NEUTS SEG NFR BLD: ABNORMAL %
PERCENT NRBC: ABNORMAL
PLATELET # BLD: 217 THOUSAND/MCL (ref 140–450)
POTASSIUM SERPL-SCNC: 4.4 MMOL/L (ref 3.4–5.1)
RBC # BLD: 4.22 MILLION/MCL (ref 4.5–5.9)
SODIUM SERPL-SCNC: 139 MMOL/L (ref 135–145)
TROPONIN I SERPL HS-MCNC: <0.02 NG/ML
WBC # BLD: 7.2 THOUSAND/MCL (ref 4.2–11)

## 2017-12-13 RX ORDER — LISINOPRIL 20 MG/1
TABLET ORAL
Qty: 30 TABLET | Refills: 0 | Status: SHIPPED | OUTPATIENT
Start: 2017-12-13 | End: 2018-02-26

## 2017-12-13 NOTE — TELEPHONE ENCOUNTER
Medication is being filled for 1 time refill only due to:   Over due for office visit and/or labs   MANUEL Suarez  RN/Florentino Fernández

## 2017-12-14 ENCOUNTER — DIAGNOSTIC TRANS (OUTPATIENT)
Dept: OTHER | Age: 62
End: 2017-12-14

## 2017-12-14 ENCOUNTER — IMAGING SERVICES (OUTPATIENT)
Dept: OTHER | Age: 62
End: 2017-12-14

## 2017-12-14 LAB
CHOLEST SERPL-MCNC: 179 MG/DL
CHOLEST/HDLC SERPL: 10.5 {RATIO}
HDLC SERPL-MCNC: 17 MG/DL
LDLC SERPL CALC-MCNC: 133 MG/DL
NONHDLC SERPL-MCNC: 162 MG/DL
TRIGLYCERIDE (TRIGP): 145 MG/DL
TROPONIN I SERPL HS-MCNC: <0.02 NG/ML
TROPONIN I SERPL HS-MCNC: <0.02 NG/ML

## 2017-12-15 ENCOUNTER — DIAGNOSTIC TRANS (OUTPATIENT)
Dept: OTHER | Age: 62
End: 2017-12-15

## 2017-12-15 ENCOUNTER — IMAGING SERVICES (OUTPATIENT)
Dept: OTHER | Age: 62
End: 2017-12-15

## 2017-12-15 LAB
ANALYZER ANC (IANC): ABNORMAL
BASOPHILS # BLD: 0 THOUSAND/MCL (ref 0–0.3)
BASOPHILS NFR BLD: 0 %
DIFFERENTIAL METHOD BLD: ABNORMAL
EOSINOPHIL # BLD: 0.2 THOUSAND/MCL (ref 0.1–0.5)
EOSINOPHIL NFR BLD: 4 %
ERYTHROCYTE [DISTWIDTH] IN BLOOD: 15.8 % (ref 11–15)
HEMATOCRIT: 26.3 % (ref 39–51)
HGB BLD-MCNC: 7.9 GM/DL (ref 13–17)
IRON SATN MFR SERPL: 6 % (ref 15–45)
IRON SERPL-MCNC: 20 MCG/DL (ref 65–175)
LYMPHOCYTES # BLD: 1.1 THOUSAND/MCL (ref 1–4)
LYMPHOCYTES NFR BLD: 26 %
MCH RBC QN AUTO: 21.9 PG (ref 26–34)
MCHC RBC AUTO-ENTMCNC: 30 GM/DL (ref 32–36.5)
MCV RBC AUTO: 72.9 FL (ref 78–100)
MONOCYTES # BLD: 0.5 THOUSAND/MCL (ref 0.3–0.9)
MONOCYTES NFR BLD: 11 %
NEUTROPHILS # BLD: 2.5 THOUSAND/MCL (ref 1.8–7.7)
NEUTROPHILS NFR BLD: 59 %
NEUTS SEG NFR BLD: ABNORMAL %
PERCENT NRBC: ABNORMAL
PLATELET # BLD: 173 THOUSAND/MCL (ref 140–450)
RBC # BLD: 3.61 MILLION/MCL (ref 4.5–5.9)
TIBC SERPL-MCNC: 350 MCG/DL (ref 250–450)
WBC # BLD: 4.3 THOUSAND/MCL (ref 4.2–11)

## 2017-12-16 LAB
ALBUMIN SERPL-MCNC: 2.6 GM/DL (ref 3.6–5.1)
ALBUMIN/GLOB SERPL: 0.6 {RATIO} (ref 1–2.4)
ALP SERPL-CCNC: 273 UNIT/L (ref 45–117)
ALT SERPL-CCNC: 67 UNIT/L
ANION GAP SERPL CALC-SCNC: 6 MMOL/L (ref 10–20)
AST SERPL-CCNC: 109 UNIT/L
BILIRUB SERPL-MCNC: 0.7 MG/DL (ref 0.2–1)
BUN SERPL-MCNC: 25 MG/DL (ref 6–20)
BUN/CREAT SERPL: 22 (ref 7–25)
CALCIUM SERPL-MCNC: 8.5 MG/DL (ref 8.4–10.2)
CHLORIDE: 107 MMOL/L (ref 98–107)
CO2 SERPL-SCNC: 25 MMOL/L (ref 21–32)
CREAT SERPL-MCNC: 1.12 MG/DL (ref 0.67–1.17)
GLOBULIN SER-MCNC: 4.3 GM/DL (ref 2–4)
GLUCOSE SERPL-MCNC: 93 MG/DL (ref 65–99)
POTASSIUM SERPL-SCNC: 4.4 MMOL/L (ref 3.4–5.1)
PROT SERPL-MCNC: 6.9 GM/DL (ref 6.4–8.2)
SODIUM SERPL-SCNC: 134 MMOL/L (ref 135–145)

## 2017-12-19 NOTE — TELEPHONE ENCOUNTER
Spoke with spouse and she indicates that pt has switched to zantac 75 mg BID.  Aleisha BAILON RN

## 2018-01-03 ENCOUNTER — CHARTING TRANS (OUTPATIENT)
Dept: OTHER | Age: 63
End: 2018-01-03

## 2018-01-03 ENCOUNTER — IMAGING SERVICES (OUTPATIENT)
Dept: OTHER | Age: 63
End: 2018-01-03

## 2018-01-03 ENCOUNTER — HOSPITAL (OUTPATIENT)
Dept: OTHER | Age: 63
End: 2018-01-03

## 2018-01-11 ENCOUNTER — CHARTING TRANS (OUTPATIENT)
Dept: OTHER | Age: 63
End: 2018-01-11

## 2018-01-13 ENCOUNTER — IMAGING SERVICES (OUTPATIENT)
Dept: OTHER | Age: 63
End: 2018-01-13

## 2018-01-13 ENCOUNTER — LAB SERVICES (OUTPATIENT)
Dept: OTHER | Age: 63
End: 2018-01-13

## 2018-01-13 ENCOUNTER — HOSPITAL (OUTPATIENT)
Dept: OTHER | Age: 63
End: 2018-01-13
Attending: SURGERY

## 2018-01-13 ENCOUNTER — CHARTING TRANS (OUTPATIENT)
Dept: OTHER | Age: 63
End: 2018-01-13

## 2018-01-13 LAB
ANALYZER ANC (IANC): ABNORMAL
BASOPHILS # BLD: 0 THOUSAND/MCL (ref 0–0.3)
BASOPHILS NFR BLD: 0 %
BLOOD BANK CMNT PATIENT-IMP: NORMAL
CO2 RESPIRATORY RATE ANES: 10
CO2 RESPIRATORY RATE ANES: 11
CO2 RESPIRATORY RATE ANES: 12
CO2 RESPIRATORY RATE ANES: 12
CO2 RESPIRATORY RATE ANES: 13
CO2 RESPIRATORY RATE ANES: 13
CO2 RESPIRATORY RATE ANES: 14
CO2 RESPIRATORY RATE ANES: 15
CO2 RESPIRATORY RATE ANES: 15
CO2 RESPIRATORY RATE ANES: 16
CO2 RESPIRATORY RATE ANES: 18
CO2 RESPIRATORY RATE ANES: 19
CO2 RESPIRATORY RATE ANES: 21
CO2 RESPIRATORY RATE ANES: 21
CO2 RESPIRATORY RATE ANES: 9
CO2 RESPIRATORY RATE ANES: 9
DIFFERENTIAL METHOD BLD: ABNORMAL
EOSINOPHIL # BLD: 0 THOUSAND/MCL (ref 0.1–0.5)
EOSINOPHIL NFR BLD: 0 %
ERYTHROCYTE [DISTWIDTH] IN BLOOD: 18.5 % (ref 11–15)
HEMATOCRIT: 28.6 % (ref 39–51)
HEMATOCRIT: 28.9 % (ref 39–51)
HGB BLD-MCNC: 8.7 GM/DL (ref 13–17)
HGB BLD-MCNC: 8.9 GM/DL (ref 13–17)
LYMPHOCYTES # BLD: 0.4 THOUSAND/MCL (ref 1–4)
LYMPHOCYTES NFR BLD: 4 %
MAC INSPIRED ANES: 0
MAC INSPIRED ANES: 0
MAC INSPIRED ANES: 0.9
MAC INSPIRED ANES: 1
MAC INSPIRED ANES: 1.1
MAC INSPIRED ANES: 1.2
MAC INSPIRED ANES: 1.3
MAC INSPIRED ANES: 1.4
MAC INSPIRED ANES: 1.4
MAC INSPIRED ANES: 1.5
MAC INSPIRED ANES: 1.6
MCH RBC QN AUTO: 22.1 PG (ref 26–34)
MCHC RBC AUTO-ENTMCNC: 30.4 GM/DL (ref 32–36.5)
MCV RBC AUTO: 72.8 FL (ref 78–100)
MONOCYTES # BLD: 0.4 THOUSAND/MCL (ref 0.3–0.9)
MONOCYTES NFR BLD: 4 %
NEUTROPHILS # BLD: 9.2 THOUSAND/MCL (ref 1.8–7.7)
NEUTROPHILS NFR BLD: 92 %
NEUTS SEG NFR BLD: ABNORMAL %
NUM BPU REQUESTED: 1
PERCENT NRBC: ABNORMAL
PLATELET # BLD: 302 THOUSAND/MCL (ref 140–450)
RBC # BLD: 3.93 MILLION/MCL (ref 4.5–5.9)
SPONT RESPIRATORY RATE ANES: 0
SPONT RESPIRATORY RATE ANES: 1
SPONT RESPIRATORY RATE ANES: 2
SPONT RESPIRATORY RATE ANES: 20
SPONT RESPIRATORY RATE ANES: 20
SPONT RESPIRATORY RATE ANES: 21
SPONT RESPIRATORY RATE ANES: 22
SPONT RESPIRATORY RATE ANES: 3
SPONT RESPIRATORY RATE ANES: 4
SPONT RESPIRATORY RATE ANES: 4
SPONT RESPIRATORY RATE ANES: 5
SPONT RESPIRATORY RATE ANES: 6
SPONT RESPIRATORY RATE ANES: 8
WBC # BLD: 10 THOUSAND/MCL (ref 4.2–11)

## 2018-01-14 ENCOUNTER — IMAGING SERVICES (OUTPATIENT)
Dept: OTHER | Age: 63
End: 2018-01-14

## 2018-01-14 ENCOUNTER — CHARTING TRANS (OUTPATIENT)
Dept: OTHER | Age: 63
End: 2018-01-14

## 2018-01-14 LAB
ALBUMIN SERPL-MCNC: 1.6 GM/DL (ref 3.6–5.1)
ALBUMIN SERPL-MCNC: 1.8 GM/DL (ref 3.6–5.1)
ALBUMIN/GLOB SERPL: 0.3 {RATIO} (ref 1–2.4)
ALBUMIN/GLOB SERPL: 0.4 {RATIO} (ref 1–2.4)
ALP SERPL-CCNC: 251 UNIT/L (ref 45–117)
ALP SERPL-CCNC: 272 UNIT/L (ref 45–117)
ALT SERPL-CCNC: 71 UNIT/L
ALT SERPL-CCNC: 81 UNIT/L
AMORPH SED URNS QL MICRO: PRESENT
ANALYZER ANC (IANC): ABNORMAL
ANION GAP SERPL CALC-SCNC: 19 MMOL/L (ref 10–20)
ANION GAP SERPL CALC-SCNC: 25 MMOL/L (ref 10–20)
APPEARANCE UR: ABNORMAL
AST SERPL-CCNC: 249 UNIT/L
AST SERPL-CCNC: 285 UNIT/L
BACTERIA #/AREA URNS HPF: ABNORMAL /HPF
BASOPHILS # BLD: 0 THOUSAND/MCL (ref 0–0.3)
BASOPHILS NFR BLD: 0 %
BILIRUB SERPL-MCNC: 6 MG/DL (ref 0.2–1)
BILIRUB SERPL-MCNC: 6.6 MG/DL (ref 0.2–1)
BILIRUB UR QL: POSITIVE
BUN SERPL-MCNC: 60 MG/DL (ref 6–20)
BUN SERPL-MCNC: 61 MG/DL (ref 6–20)
BUN/CREAT SERPL: 13 (ref 7–25)
BUN/CREAT SERPL: 14 (ref 7–25)
CALCIUM SERPL-MCNC: 7.6 MG/DL (ref 8.4–10.2)
CALCIUM SERPL-MCNC: 8.1 MG/DL (ref 8.4–10.2)
CHLORIDE: 102 MMOL/L (ref 98–107)
CHLORIDE: 104 MMOL/L (ref 98–107)
CO2 SERPL-SCNC: 18 MMOL/L (ref 21–32)
CO2 SERPL-SCNC: 21 MMOL/L (ref 21–32)
COLOR UR: YELLOW
CREAT SERPL-MCNC: 4.14 MG/DL (ref 0.67–1.17)
CREAT SERPL-MCNC: 4.7 MG/DL (ref 0.67–1.17)
DIFFERENTIAL METHOD BLD: ABNORMAL
DOHLE BODIES (DOHL): PRESENT
EOSINOPHIL # BLD: 0 THOUSAND/MCL (ref 0.1–0.5)
EOSINOPHIL NFR BLD: 0 %
ERYTHROCYTE [DISTWIDTH] IN BLOOD: 19.1 % (ref 11–15)
GLOBULIN SER-MCNC: 4.2 GM/DL (ref 2–4)
GLOBULIN SER-MCNC: 5.1 GM/DL (ref 2–4)
GLUCOSE SERPL-MCNC: 119 MG/DL (ref 65–99)
GLUCOSE SERPL-MCNC: 167 MG/DL (ref 65–99)
GLUCOSE UR-MCNC: NEGATIVE MG/DL
HEMATOCRIT: 26.2 % (ref 39–51)
HGB BLD-MCNC: 8.1 GM/DL (ref 13–17)
HGB UR QL: ABNORMAL
HYALINE CASTS #/AREA URNS LPF: ABNORMAL /LPF (ref 0–5)
HYPOCHROMIA (HYPOC): ABNORMAL
KETONES UR-MCNC: ABNORMAL MG/DL
LEUKOCYTE ESTERASE UR QL STRIP: ABNORMAL
LYMPHOCYTES # BLD: 0.3 THOUSAND/MCL (ref 1–4)
LYMPHOCYTES NFR BLD: 3 %
MCH RBC QN AUTO: 22.3 PG (ref 26–34)
MCHC RBC AUTO-ENTMCNC: 30.9 GM/DL (ref 32–36.5)
MCV RBC AUTO: 72.2 FL (ref 78–100)
MICROCYTOSIS (MICY): ABNORMAL
MONOCYTES # BLD: 0.7 THOUSAND/MCL (ref 0.3–0.9)
MONOCYTES NFR BLD: 7 %
MUCOUS THREADS URNS QL MICRO: PRESENT
NEUTROPHILS # BLD: 8.7 THOUSAND/MCL (ref 1.8–7.7)
NEUTS BAND NFR BLD: 25 % (ref 0–10)
NEUTS SEG NFR BLD: 65 %
NITRITE UR QL: NEGATIVE
ORGANIC ACIDS/CREAT UR-SRTO: 286.91 MG/DL
PATH REV BLD -IMP: ABNORMAL
PH UR: 5 UNIT (ref 5–7)
PLAT MORPH BLD: NORMAL
PLATELET # BLD: 272 THOUSAND/MCL (ref 140–450)
POTASSIUM SERPL-SCNC: 5.3 MMOL/L (ref 3.4–5.1)
POTASSIUM SERPL-SCNC: 6.1 MMOL/L (ref 3.4–5.1)
POTASSIUM SERPL-SCNC: 6.3 MMOL/L (ref 3.4–5.1)
POTASSIUM UR-SCNC: 65.8 MMOL/L
PROT SERPL-MCNC: 6 GM/DL (ref 6.4–8.2)
PROT SERPL-MCNC: 6.7 GM/DL (ref 6.4–8.2)
PROT UR QL: 30 MG/DL
RBC # BLD: 3.63 MILLION/MCL (ref 4.5–5.9)
RBC #/AREA URNS HPF: ABNORMAL /HPF (ref 0–3)
SODIUM SERPL-SCNC: 138 MMOL/L (ref 135–145)
SODIUM SERPL-SCNC: 139 MMOL/L (ref 135–145)
SODIUM UR-SCNC: 35 MMOL/L
SP GR UR: >1.03 (ref 1–1.03)
SPECIMEN SOURCE: ABNORMAL
SQUAMOUS #/AREA URNS HPF: ABNORMAL /HPF (ref 0–5)
UROBILINOGEN UR QL: 0.2 MG/DL (ref 0–1)
WBC # BLD: 9.7 THOUSAND/MCL (ref 4.2–11)
WBC #/AREA URNS HPF: ABNORMAL /HPF (ref 0–5)

## 2018-01-15 ENCOUNTER — DIAGNOSTIC TRANS (OUTPATIENT)
Dept: OTHER | Age: 63
End: 2018-01-15

## 2018-01-15 ENCOUNTER — IMAGING SERVICES (OUTPATIENT)
Dept: OTHER | Age: 63
End: 2018-01-15

## 2018-01-15 LAB
ABO + RH BLD: NORMAL
ANALYZER ANC (IANC): ABNORMAL
ANION GAP SERPL CALC-SCNC: 24 MMOL/L (ref 10–20)
ANION GAP SERPL CALC-SCNC: 25 MMOL/L (ref 10–20)
ANNOTATION COMMENT IMP: NORMAL
BASE DEFICIT BLDA-SCNC: 7 MMOL/L (ref 0–2)
BASE EXCESS BLDA CALC-SCNC: ABNORMAL MMOL/L
BASOPHILS # BLD: 0 THOUSAND/MCL (ref 0–0.3)
BASOPHILS NFR BLD: 0 %
BDY SITE: ABNORMAL
BLD GP AB SCN SERPL QL: NEGATIVE
BLD PROD TYP BPU: NORMAL
BLD UNIT ID BPU: NORMAL
BLOOD BANK CMNT PATIENT-IMP: NORMAL
BLOOD BANK CMNT PATIENT-IMP: NORMAL
BODY TEMPERATURE: 37 DEGREES
BUN SERPL-MCNC: 66 MG/DL (ref 6–20)
BUN SERPL-MCNC: 69 MG/DL (ref 6–20)
BUN/CREAT SERPL: 13 (ref 7–25)
BUN/CREAT SERPL: 13 (ref 7–25)
CA-I BLDA-SCNC: 10 % (ref 15–23)
CALCIUM SERPL-MCNC: 7.6 MG/DL (ref 8.4–10.2)
CALCIUM SERPL-MCNC: 7.8 MG/DL (ref 8.4–10.2)
CENTROMERE AB SER-ACNC: <0.2 AI (ref 0–0.9)
CHLORIDE: 105 MMOL/L (ref 98–107)
CHLORIDE: 106 MMOL/L (ref 98–107)
CHROMATIN AB SERPL-ACNC: <0.2 AI (ref 0–0.9)
CO2 BLDA-SCNC: 20 MMOL/L (ref 19–24)
CO2 SERPL-SCNC: 19 MMOL/L (ref 21–32)
CO2 SERPL-SCNC: 20 MMOL/L (ref 21–32)
COHGB MFR BLD: 1.8 %
CONDITION: ABNORMAL
CONDITION: ABNORMAL
CREAT SERPL-MCNC: 5.27 MG/DL (ref 0.67–1.17)
CREAT SERPL-MCNC: 5.46 MG/DL (ref 0.67–1.17)
CROSSMATCH EXPIRE: NORMAL
D DIMER PPP FEU-MCNC: 6.82 MG/L FEU
DIFFERENTIAL METHOD BLD: ABNORMAL
DOHLE BODIES (DOHL): PRESENT
DSDNA AB SER IA-ACNC: <1 UNIT/ML
ENA JO1 IGG SER-ACNC: <0.2 AI (ref 0–0.9)
ENA RNP IGG SER IA-ACNC: <0.2 AI (ref 0–0.9)
ENA SCL70 IGG SER QL: <0.2 AI (ref 0–0.9)
ENA SM IGG SER IA-ACNC: <0.2 AI (ref 0–0.9)
ENA SM+RNP IGG SER IA-ACNC: <0.2 AI (ref 0–0.9)
ENA SS-A AB SER IA-ACNC: <0.2 AI (ref 0–0.9)
ENA SS-B IGG SER IA-ACNC: <0.2 AI (ref 0–0.9)
EOSINOPHIL # BLD: 0 THOUSAND/MCL (ref 0.1–0.5)
EOSINOPHIL NFR BLD: 0 %
ERYTHROCYTE [DISTWIDTH] IN BLOOD: 19.3 % (ref 11–15)
GLUCOSE SERPL-MCNC: 104 MG/DL (ref 65–99)
GLUCOSE SERPL-MCNC: 108 MG/DL (ref 65–99)
HAV IGM SER QL: NEGATIVE
HBV CORE IGG+IGM SER QL: NEGATIVE
HBV SURFACE AB SER QL: NEGATIVE
HBV SURFACE AG SER QL: NEGATIVE
HCO3 BLDA-SCNC: 19 MMOL/L (ref 22–28)
HCV AB SERPL QL IA: NEGATIVE
HEMATOCRIT: 28.3 % (ref 39–51)
HEMATOCRIT: 28.9 % (ref 39–51)
HEMOGLOBIN: 8.9 G/DL (ref 13–17)
HGB BLD-MCNC: 7.5 GM/DL (ref 13–17)
HGB BLD-MCNC: 8.5 GM/DL (ref 13–17)
HOROWITZ INDEX BLD+IHG-RTO: ABNORMAL MM[HG]
HYPOCHROMIA (HYPOC): ABNORMAL
LACTATE BLDA-MCNC: 2.3 MMOL/L
LACTATE BLDV-SCNC: 2.2 MMOL/L (ref 0–2)
LYMPHOCYTES # BLD: 0.8 THOUSAND/MCL (ref 1–4)
LYMPHOCYTES NFR BLD: 6 %
M PROTEIN 1 SERPL ELPH-MCNC: 0.4 GM/DL
M PROTEIN 2 SERPL ELPH-MCNC: ABNORMAL GM/DL
MAGNESIUM SERPL-MCNC: 2.5 MG/DL (ref 1.7–2.4)
MAJ XM SERPL-IMP: NORMAL
MCH RBC QN AUTO: 21.9 PG (ref 26–34)
MCHC RBC AUTO-ENTMCNC: 30 GM/DL (ref 32–36.5)
MCV RBC AUTO: 72.8 FL (ref 78–100)
METHGB MFR BLD: 1.6 %
MICROCYTOSIS (MICY): ABNORMAL
MONOCYTES # BLD: 0.4 THOUSAND/MCL (ref 0.3–0.9)
MONOCYTES NFR BLD: 3 %
NEUTROPHILS # BLD: 12.3 THOUSAND/MCL (ref 1.8–7.7)
NEUTS BAND NFR BLD: 24 % (ref 0–10)
NEUTS SEG NFR BLD: 67 %
NUM BPU REQUESTED: 1
OXYHGB MFR BLD: 96.1 % (ref 94–98)
PATH INTERP SPEC-IMP: ABNORMAL
PATH REV BLD -IMP: ABNORMAL
PCO2 BLDA: 39 MM HG (ref 35–48)
PH BLDA: 7.29 UNIT (ref 7.35–7.45)
PHOSPHATE SERPL-MCNC: 7.6 MG/DL (ref 2.4–4.7)
PLAT MORPH BLD: NORMAL
PLATELET # BLD: 340 THOUSAND/MCL (ref 140–450)
PO2 BLDA: 113 MM HG (ref 83–108)
POLYCHROMASIA (POLY): ABNORMAL
POTASSIUM BLD-SCNC: 5.5 MMOL/L (ref 3.4–5.1)
POTASSIUM SERPL-SCNC: 5.5 MMOL/L (ref 3.4–5.1)
POTASSIUM SERPL-SCNC: 5.6 MMOL/L (ref 3.4–5.1)
PROT SERPL-MCNC: 5.8 GM/DL (ref 6.4–8.2)
RBC # BLD: 3.89 MILLION/MCL (ref 4.5–5.9)
RIBOSOMAL P IGG SER-ACNC: <0.2 AI (ref 0–0.9)
SAO2 % BLDA: 100 % (ref 95–99)
SODIUM SERPL-SCNC: 143 MMOL/L (ref 135–145)
SODIUM SERPL-SCNC: 144 MMOL/L (ref 135–145)
TOXIC GRANULATION (TOXIC): PRESENT
TRANSFUSION STATUS: NORMAL
TROPONIN I SERPL HS-MCNC: <0.02 NG/ML
UNIT DIVISION: 0
WBC # BLD: 13.5 THOUSAND/MCL (ref 4.2–11)

## 2018-01-16 LAB
ANALYZER ANC (IANC): ABNORMAL
ANION GAP SERPL CALC-SCNC: 14 MMOL/L (ref 10–20)
BASOPHILS # BLD: 0 THOUSAND/MCL (ref 0–0.3)
BASOPHILS NFR BLD: 0 %
BUN SERPL-MCNC: 49 MG/DL (ref 6–20)
BUN/CREAT SERPL: 12 (ref 7–25)
CALCIUM SERPL-MCNC: 7.6 MG/DL (ref 8.4–10.2)
CHLORIDE: 103 MMOL/L (ref 98–107)
CO2 SERPL-SCNC: 25 MMOL/L (ref 21–32)
CREAT SERPL-MCNC: 4.17 MG/DL (ref 0.67–1.17)
DIFFERENTIAL METHOD BLD: ABNORMAL
DOHLE BODIES (DOHL): PRESENT
EOSINOPHIL # BLD: 0 THOUSAND/MCL (ref 0.1–0.5)
EOSINOPHIL NFR BLD: 0 %
ERYTHROCYTE [DISTWIDTH] IN BLOOD: 19.1 % (ref 11–15)
FERRITIN SERPL-MCNC: 444 NG/ML (ref 26–388)
GLUCOSE SERPL-MCNC: 94 MG/DL (ref 65–99)
HEMATOCRIT: 23.4 % (ref 39–51)
HGB BLD-MCNC: 7.2 GM/DL (ref 13–17)
IRON SATN MFR SERPL: 4 % (ref 15–45)
IRON SERPL-MCNC: 8 MCG/DL (ref 65–175)
LYMPHOCYTES # BLD: 0.1 THOUSAND/MCL (ref 1–4)
LYMPHOCYTES NFR BLD: 1 %
MAGNESIUM SERPL-MCNC: 2.2 MG/DL (ref 1.7–2.4)
MCH RBC QN AUTO: 21.7 PG (ref 26–34)
MCHC RBC AUTO-ENTMCNC: 30.8 GM/DL (ref 32–36.5)
MCV RBC AUTO: 70.5 FL (ref 78–100)
MICROCYTOSIS (MICY): ABNORMAL
MONOCYTES # BLD: 0.1 THOUSAND/MCL (ref 0.3–0.9)
MONOCYTES NFR BLD: 1 %
NEUTROPHILS # BLD: 7 THOUSAND/MCL (ref 1.8–7.7)
NEUTS BAND NFR BLD: 1 % (ref 0–10)
NEUTS SEG NFR BLD: 97 %
OVALOCYTES (OVALO): ABNORMAL
PATH REV BLD -IMP: ABNORMAL
PHOSPHATE SERPL-MCNC: 6.6 MG/DL (ref 2.4–4.7)
PLAT MORPH BLD: NORMAL
PLATELET # BLD: 174 THOUSAND/MCL (ref 140–450)
POLYCHROMASIA (POLY): ABNORMAL
POTASSIUM SERPL-SCNC: 4.3 MMOL/L (ref 3.4–5.1)
RBC # BLD: 3.32 MILLION/MCL (ref 4.5–5.9)
ROULEAUX (ROULE): PRESENT
SHISTOCYTES (SHSTO): ABNORMAL
SODIUM SERPL-SCNC: 138 MMOL/L (ref 135–145)
TIBC SERPL-MCNC: 179 MCG/DL (ref 250–450)
WBC # BLD: 7.1 THOUSAND/MCL (ref 4.2–11)

## 2018-01-17 ENCOUNTER — HOSPITAL (OUTPATIENT)
Dept: OTHER | Age: 63
End: 2018-01-17
Attending: INTERNAL MEDICINE

## 2018-01-17 LAB
ANALYZER ANC (IANC): ABNORMAL
ANION GAP SERPL CALC-SCNC: 18 MMOL/L (ref 10–20)
BASOPHILS # BLD: 0 THOUSAND/MCL (ref 0–0.3)
BASOPHILS NFR BLD: 0 %
BUN SERPL-MCNC: 66 MG/DL (ref 6–20)
BUN/CREAT SERPL: 13 (ref 7–25)
CALCIUM SERPL-MCNC: 7.9 MG/DL (ref 8.4–10.2)
CHLORIDE: 103 MMOL/L (ref 98–107)
CO2 SERPL-SCNC: 25 MMOL/L (ref 21–32)
CREAT SERPL-MCNC: 4.92 MG/DL (ref 0.67–1.17)
DIFFERENTIAL METHOD BLD: ABNORMAL
EOSINOPHIL # BLD: 0 THOUSAND/MCL (ref 0.1–0.5)
EOSINOPHIL NFR BLD: 0 %
ERYTHROCYTE [DISTWIDTH] IN BLOOD: 19.5 % (ref 11–15)
GLUCOSE SERPL-MCNC: 112 MG/DL (ref 65–99)
HEMATOCRIT: 22.7 % (ref 39–51)
HGB BLD-MCNC: 7.2 GM/DL (ref 13–17)
LYMPHOCYTES # BLD: 0.2 THOUSAND/MCL (ref 1–4)
LYMPHOCYTES NFR BLD: 3 %
MAGNESIUM SERPL-MCNC: 2.4 MG/DL (ref 1.7–2.4)
MCH RBC QN AUTO: 22.2 PG (ref 26–34)
MCHC RBC AUTO-ENTMCNC: 31.7 GM/DL (ref 32–36.5)
MCV RBC AUTO: 70.1 FL (ref 78–100)
MICROCYTOSIS (MICY): ABNORMAL
MONOCYTES # BLD: 0.3 THOUSAND/MCL (ref 0.3–0.9)
MONOCYTES NFR BLD: 4 %
NEUTROPHILS # BLD: 7.2 THOUSAND/MCL (ref 1.8–7.7)
NEUTS BAND NFR BLD: 5 % (ref 0–10)
NEUTS SEG NFR BLD: 88 %
OVALOCYTES (OVALO): ABNORMAL
PATH REV BLD -IMP: ABNORMAL
PHOSPHATE SERPL-MCNC: 8.2 MG/DL (ref 2.4–4.7)
PLAT MORPH BLD: NORMAL
PLATELET # BLD: 201 THOUSAND/MCL (ref 140–450)
POLYCHROMASIA (POLY): ABNORMAL
POTASSIUM SERPL-SCNC: 3.9 MMOL/L (ref 3.4–5.1)
RBC # BLD: 3.24 MILLION/MCL (ref 4.5–5.9)
SHISTOCYTES (SHSTO): ABNORMAL
SODIUM SERPL-SCNC: 142 MMOL/L (ref 135–145)
WBC # BLD: 7.7 THOUSAND/MCL (ref 4.2–11)

## 2018-02-26 DIAGNOSIS — I10 ESSENTIAL HYPERTENSION: ICD-10-CM

## 2018-02-26 DIAGNOSIS — M25.50 MULTIPLE JOINT PAIN: ICD-10-CM

## 2018-02-26 RX ORDER — LISINOPRIL 20 MG/1
20 TABLET ORAL DAILY
Qty: 30 TABLET | Refills: 0 | Status: SHIPPED | OUTPATIENT
Start: 2018-02-26 | End: 2018-05-03

## 2018-02-26 RX ORDER — NAPROXEN 500 MG/1
TABLET ORAL
Qty: 180 TABLET | Refills: 0 | Status: SHIPPED | OUTPATIENT
Start: 2018-02-26 | End: 2018-07-12

## 2018-02-26 NOTE — TELEPHONE ENCOUNTER
Prescription approved per [facility] Refill Protocol or patient Primary care provider (PCP)  MANUEL Suarez RN/Florentino Fernández

## 2018-02-27 DIAGNOSIS — I47.10 PAROXYSMAL SUPRAVENTRICULAR TACHYCARDIA (H): ICD-10-CM

## 2018-02-27 RX ORDER — METOPROLOL SUCCINATE 25 MG/1
50 TABLET, EXTENDED RELEASE ORAL DAILY
Qty: 90 TABLET | Refills: 1 | Status: SHIPPED | OUTPATIENT
Start: 2018-02-27 | End: 2018-07-12

## 2018-07-12 ENCOUNTER — TELEPHONE (OUTPATIENT)
Dept: FAMILY MEDICINE | Facility: CLINIC | Age: 63
End: 2018-07-12

## 2018-07-12 DIAGNOSIS — I47.10 PAROXYSMAL SUPRAVENTRICULAR TACHYCARDIA (H): ICD-10-CM

## 2018-07-12 DIAGNOSIS — I48.0 PAROXYSMAL ATRIAL FIBRILLATION (H): ICD-10-CM

## 2018-07-12 DIAGNOSIS — M25.50 MULTIPLE JOINT PAIN: ICD-10-CM

## 2018-07-12 DIAGNOSIS — I10 ESSENTIAL HYPERTENSION: ICD-10-CM

## 2018-07-12 DIAGNOSIS — I10 BENIGN ESSENTIAL HYPERTENSION: ICD-10-CM

## 2018-07-12 DIAGNOSIS — K21.00 GASTROESOPHAGEAL REFLUX DISEASE WITH ESOPHAGITIS: ICD-10-CM

## 2018-07-12 RX ORDER — HYDROCHLOROTHIAZIDE 25 MG/1
25 TABLET ORAL DAILY
Qty: 90 TABLET | Refills: 1 | Status: SHIPPED | OUTPATIENT
Start: 2018-07-12 | End: 2018-07-18

## 2018-07-12 RX ORDER — NAPROXEN 500 MG/1
500 TABLET ORAL 2 TIMES DAILY WITH MEALS
Qty: 180 TABLET | Refills: 0 | Status: SHIPPED | OUTPATIENT
Start: 2018-07-12

## 2018-07-12 RX ORDER — LISINOPRIL 20 MG/1
TABLET ORAL
Qty: 90 TABLET | Refills: 1 | Status: SHIPPED | OUTPATIENT
Start: 2018-07-12 | End: 2018-07-18

## 2018-07-12 RX ORDER — METOPROLOL SUCCINATE 25 MG/1
50 TABLET, EXTENDED RELEASE ORAL DAILY
Qty: 90 TABLET | Refills: 1 | Status: SHIPPED | OUTPATIENT
Start: 2018-07-12 | End: 2018-07-18

## 2018-07-12 NOTE — TELEPHONE ENCOUNTER
Called and spoke with patient  Advised appt he has one for next WED at 9 AM advised needs to keep appt he agreed  RX sent  MANUEL Suarez  RN/Florentino Fernández

## 2018-07-18 ENCOUNTER — OFFICE VISIT (OUTPATIENT)
Dept: FAMILY MEDICINE | Facility: CLINIC | Age: 63
End: 2018-07-18

## 2018-07-18 VITALS
TEMPERATURE: 98.2 F | HEART RATE: 84 BPM | HEIGHT: 62 IN | WEIGHT: 177.8 LBS | DIASTOLIC BLOOD PRESSURE: 84 MMHG | SYSTOLIC BLOOD PRESSURE: 130 MMHG | BODY MASS INDEX: 32.72 KG/M2 | RESPIRATION RATE: 20 BRPM

## 2018-07-18 DIAGNOSIS — K59.00 CONSTIPATION, UNSPECIFIED CONSTIPATION TYPE: Primary | ICD-10-CM

## 2018-07-18 DIAGNOSIS — I48.0 PAF (PAROXYSMAL ATRIAL FIBRILLATION) (H): ICD-10-CM

## 2018-07-18 DIAGNOSIS — R39.198 DECREASED URINE STREAM: ICD-10-CM

## 2018-07-18 DIAGNOSIS — N52.9 ERECTILE DYSFUNCTION, UNSPECIFIED ERECTILE DYSFUNCTION TYPE: ICD-10-CM

## 2018-07-18 DIAGNOSIS — F32.9 REACTIVE DEPRESSION: ICD-10-CM

## 2018-07-18 DIAGNOSIS — I47.10 PAROXYSMAL SUPRAVENTRICULAR TACHYCARDIA (H): ICD-10-CM

## 2018-07-18 DIAGNOSIS — Z11.3 SCREEN FOR STD (SEXUALLY TRANSMITTED DISEASE): ICD-10-CM

## 2018-07-18 DIAGNOSIS — I10 BENIGN ESSENTIAL HYPERTENSION: ICD-10-CM

## 2018-07-18 LAB
ALBUMIN SERPL-MCNC: 4.1 G/DL (ref 3.4–5)
ALP SERPL-CCNC: 70 U/L (ref 40–150)
ALT SERPL W P-5'-P-CCNC: 30 U/L (ref 0–70)
ANION GAP SERPL CALCULATED.3IONS-SCNC: 5 MMOL/L (ref 3–14)
AST SERPL W P-5'-P-CCNC: 24 U/L (ref 0–45)
BASOPHILS # BLD AUTO: 0 10E9/L (ref 0–0.2)
BASOPHILS NFR BLD AUTO: 0.6 %
BILIRUB SERPL-MCNC: 0.6 MG/DL (ref 0.2–1.3)
BUN SERPL-MCNC: 12 MG/DL (ref 7–30)
CALCIUM SERPL-MCNC: 8.4 MG/DL (ref 8.5–10.1)
CHLORIDE SERPL-SCNC: 109 MMOL/L (ref 94–109)
CHOLEST SERPL-MCNC: 155 MG/DL
CO2 SERPL-SCNC: 27 MMOL/L (ref 20–32)
CREAT SERPL-MCNC: 0.94 MG/DL (ref 0.66–1.25)
CREAT UR-MCNC: 201 MG/DL
DIFFERENTIAL METHOD BLD: NORMAL
EOSINOPHIL # BLD AUTO: 0.1 10E9/L (ref 0–0.7)
EOSINOPHIL NFR BLD AUTO: 1 %
ERYTHROCYTE [DISTWIDTH] IN BLOOD BY AUTOMATED COUNT: 12.5 % (ref 10–15)
GFR SERPL CREATININE-BSD FRML MDRD: 81 ML/MIN/1.7M2
GLUCOSE SERPL-MCNC: 109 MG/DL (ref 70–99)
HCT VFR BLD AUTO: 46.6 % (ref 40–53)
HDLC SERPL-MCNC: 33 MG/DL
HGB BLD-MCNC: 15.9 G/DL (ref 13.3–17.7)
LDLC SERPL CALC-MCNC: 88 MG/DL
LYMPHOCYTES # BLD AUTO: 2.1 10E9/L (ref 0.8–5.3)
LYMPHOCYTES NFR BLD AUTO: 31.2 %
MCH RBC QN AUTO: 31.8 PG (ref 26.5–33)
MCHC RBC AUTO-ENTMCNC: 34.1 G/DL (ref 31.5–36.5)
MCV RBC AUTO: 93 FL (ref 78–100)
MICROALBUMIN UR-MCNC: 11 MG/L
MICROALBUMIN/CREAT UR: 5.67 MG/G CR (ref 0–17)
MONOCYTES # BLD AUTO: 0.5 10E9/L (ref 0–1.3)
MONOCYTES NFR BLD AUTO: 7 %
NEUTROPHILS # BLD AUTO: 4.1 10E9/L (ref 1.6–8.3)
NEUTROPHILS NFR BLD AUTO: 60.2 %
NONHDLC SERPL-MCNC: 122 MG/DL
PLATELET # BLD AUTO: 212 10E9/L (ref 150–450)
POTASSIUM SERPL-SCNC: 3.7 MMOL/L (ref 3.4–5.3)
PROT SERPL-MCNC: 7.7 G/DL (ref 6.8–8.8)
RBC # BLD AUTO: 5 10E12/L (ref 4.4–5.9)
SODIUM SERPL-SCNC: 141 MMOL/L (ref 133–144)
TRIGL SERPL-MCNC: 169 MG/DL
WBC # BLD AUTO: 6.8 10E9/L (ref 4–11)

## 2018-07-18 PROCEDURE — 85025 COMPLETE CBC W/AUTO DIFF WBC: CPT | Performed by: NURSE PRACTITIONER

## 2018-07-18 PROCEDURE — 36415 COLL VENOUS BLD VENIPUNCTURE: CPT | Performed by: NURSE PRACTITIONER

## 2018-07-18 PROCEDURE — 80061 LIPID PANEL: CPT | Performed by: NURSE PRACTITIONER

## 2018-07-18 PROCEDURE — 87389 HIV-1 AG W/HIV-1&-2 AB AG IA: CPT | Performed by: NURSE PRACTITIONER

## 2018-07-18 PROCEDURE — 82043 UR ALBUMIN QUANTITATIVE: CPT | Performed by: NURSE PRACTITIONER

## 2018-07-18 PROCEDURE — 87522 HEPATITIS C REVRS TRNSCRPJ: CPT | Performed by: NURSE PRACTITIONER

## 2018-07-18 PROCEDURE — 86803 HEPATITIS C AB TEST: CPT | Performed by: NURSE PRACTITIONER

## 2018-07-18 PROCEDURE — 80053 COMPREHEN METABOLIC PANEL: CPT | Performed by: NURSE PRACTITIONER

## 2018-07-18 PROCEDURE — 99215 OFFICE O/P EST HI 40 MIN: CPT | Performed by: NURSE PRACTITIONER

## 2018-07-18 RX ORDER — METOPROLOL SUCCINATE 25 MG/1
50 TABLET, EXTENDED RELEASE ORAL DAILY
Qty: 90 TABLET | Refills: 1 | Status: SHIPPED | OUTPATIENT
Start: 2018-07-18

## 2018-07-18 RX ORDER — ESCITALOPRAM OXALATE 10 MG/1
10 TABLET ORAL DAILY
Qty: 90 TABLET | Refills: 1 | Status: SHIPPED | OUTPATIENT
Start: 2018-07-18 | End: 2018-07-18

## 2018-07-18 RX ORDER — LISINOPRIL 20 MG/1
20 TABLET ORAL DAILY
Qty: 90 TABLET | Refills: 1 | Status: SHIPPED | OUTPATIENT
Start: 2018-07-18

## 2018-07-18 RX ORDER — HYDROCHLOROTHIAZIDE 25 MG/1
25 TABLET ORAL DAILY
Qty: 90 TABLET | Refills: 1 | Status: SHIPPED | OUTPATIENT
Start: 2018-07-18

## 2018-07-18 RX ORDER — POLYETHYLENE GLYCOL 3350 17 G/17G
1 POWDER, FOR SOLUTION ORAL DAILY
Qty: 510 G | Refills: 1 | Status: SHIPPED | OUTPATIENT
Start: 2018-07-18

## 2018-07-18 ASSESSMENT — ENCOUNTER SYMPTOMS
NUMBNESS: 0
RHINORRHEA: 0
LIGHT-HEADEDNESS: 0
SINUS PRESSURE: 0
ABDOMINAL PAIN: 1
HEADACHES: 1
DYSPHORIC MOOD: 1
DIZZINESS: 0
DIARRHEA: 1
PALPITATIONS: 0
FATIGUE: 0
ARTHRALGIAS: 0
FREQUENCY: 1
JOINT SWELLING: 0
SORE THROAT: 0
SHORTNESS OF BREATH: 0
COUGH: 0
NERVOUS/ANXIOUS: 0
DYSURIA: 0
SLEEP DISTURBANCE: 0
CONSTIPATION: 1
WHEEZING: 0
FLANK PAIN: 0

## 2018-07-18 ASSESSMENT — PAIN SCALES - GENERAL: PAINLEVEL: NO PAIN (0)

## 2018-07-18 NOTE — LETTER
July 24, 2018      Odilon Perea  [address] [address]          Odilon,     You are not spilling any protein into your urine.  This is good.       Component      Latest Ref Rng & Units 7/18/2018   Creatinine Urine      mg/dL 201   Albumin Urine mg/L      mg/L 11   Albumin Urine mg/g Cr      0 - 17 mg/g Cr 5.67     If you have any questions or concerns, please call the clinic at the number listed above.       Sincerely,        Laila Back APRN CNP/ag
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
## 2018-07-18 NOTE — LETTER
July 20, 2018      Odilon RAY Eliazar  [address] [address] [address]        Dear ,    We are writing to inform you of your test results.    Your initial blood test for hepatitis C was reactive.  However, the more definitive test showed that you do NOT have hepatitis C.    Your blood tests for HIV is negative.    Resulted Orders   CBC with platelets differential   Result Value Ref Range    WBC 6.8 4.0 - 11.0 10e9/L    RBC Count 5.00 4.4 - 5.9 10e12/L    Hemoglobin 15.9 13.3 - 17.7 g/dL    Hematocrit 46.6 40.0 - 53.0 %    MCV 93 78 - 100 fl    MCH 31.8 26.5 - 33.0 pg    MCHC 34.1 31.5 - 36.5 g/dL    RDW 12.5 10.0 - 15.0 %    Platelet Count 212 150 - 450 10e9/L    Diff Method Automated Method     % Neutrophils 60.2 %    % Lymphocytes 31.2 %    % Monocytes 7.0 %    % Eosinophils 1.0 %    % Basophils 0.6 %    Absolute Neutrophil 4.1 1.6 - 8.3 10e9/L    Absolute Lymphocytes 2.1 0.8 - 5.3 10e9/L    Absolute Monocytes 0.5 0.0 - 1.3 10e9/L    Absolute Eosinophils 0.1 0.0 - 0.7 10e9/L    Absolute Basophils 0.0 0.0 - 0.2 10e9/L   Comprehensive metabolic panel   Result Value Ref Range    Sodium 141 133 - 144 mmol/L    Potassium 3.7 3.4 - 5.3 mmol/L    Chloride 109 94 - 109 mmol/L    Carbon Dioxide 27 20 - 32 mmol/L    Anion Gap 5 3 - 14 mmol/L    Glucose 109 (H) 70 - 99 mg/dL      Comment:      Fasting specimen    Urea Nitrogen 12 7 - 30 mg/dL    Creatinine 0.94 0.66 - 1.25 mg/dL    GFR Estimate 81 >60 mL/min/1.7m2      Comment:      Non  GFR Calc    GFR Estimate If Black >90 >60 mL/min/1.7m2      Comment:       GFR Calc    Calcium 8.4 (L) 8.5 - 10.1 mg/dL    Bilirubin Total 0.6 0.2 - 1.3 mg/dL    Albumin 4.1 3.4 - 5.0 g/dL    Protein Total 7.7 6.8 - 8.8 g/dL    Alkaline Phosphatase 70 40 - 150 U/L    ALT 30 0 - 70 U/L    AST 24 0 - 45 U/L   Albumin Random Urine Quantitative with Creat Ratio   Result Value Ref Range    Creatinine Urine 201 mg/dL    Albumin Urine mg/L 11 mg/L     Albumin Urine mg/g Cr 5.67 0 - 17 mg/g Cr   HIV Antigen Antibody Combo   Result Value Ref Range    HIV Antigen Antibody Combo Nonreactive NR^Nonreactive          Comment:      HIV-1 p24 Ag & HIV-1/HIV-2 Ab Not Detected   Hepatitis C Screen Reflex to HCV RNA Quant and Genotype   Result Value Ref Range    Hepatitis C Antibody Reactive (AA) NR^Nonreactive      Comment:      A reactive result indicates one of the following 1) current HCV infection 2)   past HCV infection that has resolved or 3) false positivity. The CDC   recommends that a reactive result should be followed by Nucleic acid testing   for HCV RNA.   If HCV RNA is detected, that indicates current HCV infection. If HCV RNA is   not detected, that indicates either past, resolved HCV infection, or false HCV   antibody positivity.  Assay performance characteristics have not been established for newborns,   infants, and children     Lipid panel reflex to direct LDL Fasting   Result Value Ref Range    Cholesterol 155 <200 mg/dL    Triglycerides 169 (H) <150 mg/dL      Comment:      Borderline high:  150-199 mg/dl  High:             200-499 mg/dl  Very high:       >499 mg/dl  Fasting specimen      HDL Cholesterol 33 (L) >39 mg/dL    LDL Cholesterol Calculated 88 <100 mg/dL      Comment:      Desirable:       <100 mg/dl    Non HDL Cholesterol 122 <130 mg/dL   Hepatitis C RNA Quantitative   Result Value Ref Range    HCV RNA Quant IU/ml HCV RNA Not Detected HCVND^HCV RNA Not Detected [IU]/mL      Comment:      The FRANCK AmpliPrep/FRANCK TaqMan HCV Test is an FDA-approved in vitro nucleic   acid amplification test for the quantitation of HCV RNA in human plasma (ETDA   plasma) or serum using the FRANCK AmpliPrep Instrument for automated viral   nucleic acid extraction and the FRANCK TaqMan Analyzer or FRANCK TaqMan for   automated Real Time PCR amplification and detection of the viral nucleic acid   target.  Titer results are reported in International Units/mL (IU/mL)  using the 1st WHO   International standard for HCV for Nucleic Acid Amplification based assays.      Log of HCV RNA Qt Not Calculated <1.2 Log IU/mL       If you have any questions or concerns, please call the clinic at the number listed above.       Sincerely,        JACKELINE Kennedy CNP

## 2018-07-18 NOTE — MR AVS SNAPSHOT
After Visit Summary   7/18/2018    Odilon Perea    MRN: [id number]           Patient Information     Date Of Birth          1955        Visit Information        Provider Department      7/18/2018 8:45 AM Laila Back APRN CNP; [place] [employer] [facility]        Today's Diagnoses     Constipation, unspecified constipation type    -  1    Paroxysmal supraventricular tachycardia (H)        Essential hypertension        Benign essential hypertension        Reactive depression        Screen for STD (sexually transmitted disease)        Decreased urine stream          Care Instructions    Start taking 1 scoop of miralax daily.               Follow-ups after your visit        Additional Services     MENTAL HEALTH REFERRAL  - Adult; Outpatient Treatment; Individual/Couples/Family/Group Therapy/Health Psychology; [facility]: [facility] [phone]; We will contact you to schedule the appointment or please call with any questions       All scheduling is subject to the client's specific insurance plan & benefits, provider/location availability, and provider clinical specialities.  Please arrive 15 minutes early for your first appointment and bring your completed paperwork.    Please be aware that coverage of these services is subject to the terms and limitations of your health insurance plan.  Call member services at your health plan with any benefit or coverage questions.                      UROLOGY ADULT REFERRAL       Your provider has referred you to: [facility]: [facility] [phone]   [email or web address]    Please be aware that coverage of these services is subject to the terms and limitations of your health insurance plan.  Call member services at your health plan with any benefit or coverage questions.      Please bring the following with you to  "your appointment:    (1) Any X-Rays, CTs or MRIs which have been performed.  Contact the facility where they were done to arrange for  prior to your scheduled appointment.    (2) List of current medications  (3) This referral request   (4) Any documents/labs given to you for this referral                  Who to contact     Normal or non-critical lab and imaging results will be communicated to you by MyChart, letter or phone within 4 business days after the clinic has received the results. If you do not hear from us within 7 days, please contact the clinic through MyChart or phone. If you have a critical or abnormal lab result, we will notify you by phone as soon as possible.  Submit refill requests through [employer] or call your pharmacy and they will forward the refill request to us. Please allow 3 business days for your refill to be completed.          If you need to speak with a  for additional information , please call: [phone]           Additional Information About Your Visit        Care EveryWhere ID     This is your Care EveryWhere ID. This could be used by other organizations to access your [facility] medical records  [id number]        Your Vitals Were     Pulse Temperature Respirations Height BMI (Body Mass Index)       84 98.2  F (36.8  C) (Tympanic) 20 5' 2.25\" (1.581 m) 32.26 kg/m2        Blood Pressure from Last 3 Encounters:   07/18/18 (!) 154/96   10/13/17 (!) 150/104   10/03/17 130/80    Weight from Last 3 Encounters:   07/18/18 177 lb 12.8 oz (80.6 kg)   10/13/17 183 lb 4 oz (83.1 kg)   10/03/17 182 lb (82.6 kg)              We Performed the Following     Albumin Random Urine Quantitative with Creat Ratio     CBC with platelets differential     Comprehensive metabolic panel     Hepatitis C Screen Reflex to HCV RNA Quant and Genotype     HIV Antigen Antibody Combo     Lipid panel reflex to direct LDL Fasting     MENTAL HEALTH REFERRAL  - Adult; Outpatient Treatment; " Individual/Couples/Family/Group Therapy/Health Psychology; [facility]: [facility] [phone]; We will contact you to schedule the appointment or please call with any questions     UROLOGY ADULT REFERRAL          Today's Medication Changes          These changes are accurate as of 7/18/18 10:04 AM.  If you have any questions, ask your nurse or doctor.               Start taking these medicines.        Dose/Directions    polyethylene glycol powder   Commonly known as:  MIRALAX   Used for:  Constipation, unspecified constipation type   Started by:  Laila Back APRN CNP        Dose:  1 capful   Take 17 g (1 capful) by mouth daily   Quantity:  510 g   Refills:  1         These medicines have changed or have updated prescriptions.        Dose/Directions    lisinopril 20 MG tablet   Commonly known as:  PRINIVIL/ZESTRIL   This may have changed:    - how much to take  - how to take this  - when to take this  - additional instructions   Used for:  Benign essential hypertension   Changed by:  Laila Back APRN CNP        Dose:  20 mg   Take 1 tablet (20 mg) by mouth daily   Quantity:  90 tablet   Refills:  1            Where to get your medicines      These medications were sent to [facility] [address] [address]     Phone:  [phone]     hydrochlorothiazide 25 MG tablet    lisinopril 20 MG tablet    metoprolol succinate 25 MG 24 hr tablet    polyethylene glycol powder                Primary Care Provider Office Phone # Fax #    JACKELINE Diez [phone]       [address] [address]        Equal Access to Services     [facility] : Denzel echevarria [place], waaxda luqadaha, qaybta kaalmasera andrews. So [facility] [phone].    ATENCIÓN: Si habla español, tiene a purvis disposición servicios  nancy de asistencia lingüística. Puneet ramírez [phone].    We comply with applicable federal civil rights laws and [place] laws. We do not discriminate on the basis of race, color, national origin, age, disability, sex, sexual orientation, or gender identity.            Thank you!     Thank you for choosing [facility]  for your care. Our goal is always to provide you with excellent care. Hearing back from our patients is one way we can continue to improve our services. Please take a few minutes to complete the written survey that you may receive in the mail after your visit with us. Thank you!             Your Updated Medication List - Protect others around you: Learn how to safely use, store and throw away your medicines at www.disposemymeds.org.          This list is accurate as of 7/18/18 10:04 AM.  Always use your most recent med list.                   Brand Name Dispense Instructions for use Diagnosis    aspirin 325 MG EC tablet     90 tablet    Take 1 tablet (325 mg) by mouth daily    Paroxysmal atrial fibrillation (H)       hydrochlorothiazide 25 MG tablet    HYDRODIURIL    90 tablet    Take 1 tablet (25 mg) by mouth daily    Benign essential hypertension       HYDROcodone-acetaminophen 5-325 MG per tablet    NORCO    30 tablet    Take 1 tablet by mouth nightly as needed for moderate to severe pain    Cervicalgia, Acute bilateral low back pain without sciatica       lisinopril 20 MG tablet    PRINIVIL/ZESTRIL    90 tablet    Take 1 tablet (20 mg) by mouth daily    Benign essential hypertension       metoprolol succinate 25 MG 24 hr tablet    TOPROL-XL    90 tablet    Take 2 tablets (50 mg) by mouth daily    Paroxysmal supraventricular tachycardia (H)       naproxen 500 MG tablet    NAPROSYN    180 tablet    Take 1 tablet (500 mg) by mouth 2 times daily (with meals)    Multiple joint pain       omeprazole 20 MG CR capsule    priLOSEC    90 capsule    Take 1 capsule (20 mg) by mouth  daily    Gastroesophageal reflux disease with esophagitis       polyethylene glycol powder    MIRALAX    510 g    Take 17 g (1 capful) by mouth daily    Constipation, unspecified constipation type       TYLENOL PO

## 2018-07-18 NOTE — LETTER
July 20, 2018      Odilon RAY Eliazar  [address] [facility] [address]        Dear ,    We are writing to inform you of your test results.    Your blood sugar is just a bit elevated.  Would recommend trying to increase your activity and having a lower carbohydrate diet.  Will plan to continue to monitor this in the future.    Your triglycerides are also a bit elevated.  This correlates with your blood sugar being high as your triglycerides are the sugary starchy part of your cholesterol profile.  We will plan to continue to monitor this and should recheck it in 3-6 months.    Resulted Orders   CBC with platelets differential   Result Value Ref Range    WBC 6.8 4.0 - 11.0 10e9/L    RBC Count 5.00 4.4 - 5.9 10e12/L    Hemoglobin 15.9 13.3 - 17.7 g/dL    Hematocrit 46.6 40.0 - 53.0 %    MCV 93 78 - 100 fl    MCH 31.8 26.5 - 33.0 pg    MCHC 34.1 31.5 - 36.5 g/dL    RDW 12.5 10.0 - 15.0 %    Platelet Count 212 150 - 450 10e9/L    Diff Method Automated Method     % Neutrophils 60.2 %    % Lymphocytes 31.2 %    % Monocytes 7.0 %    % Eosinophils 1.0 %    % Basophils 0.6 %    Absolute Neutrophil 4.1 1.6 - 8.3 10e9/L    Absolute Lymphocytes 2.1 0.8 - 5.3 10e9/L    Absolute Monocytes 0.5 0.0 - 1.3 10e9/L    Absolute Eosinophils 0.1 0.0 - 0.7 10e9/L    Absolute Basophils 0.0 0.0 - 0.2 10e9/L   Comprehensive metabolic panel   Result Value Ref Range    Sodium 141 133 - 144 mmol/L    Potassium 3.7 3.4 - 5.3 mmol/L    Chloride 109 94 - 109 mmol/L    Carbon Dioxide 27 20 - 32 mmol/L    Anion Gap 5 3 - 14 mmol/L    Glucose 109 (H) 70 - 99 mg/dL      Comment:      Fasting specimen    Urea Nitrogen 12 7 - 30 mg/dL    Creatinine 0.94 0.66 - 1.25 mg/dL    GFR Estimate 81 >60 mL/min/1.7m2      Comment:      Non  GFR Calc    GFR Estimate If Black >90 >60 mL/min/1.7m2      Comment:       GFR Calc    Calcium 8.4 (L) 8.5 - 10.1 mg/dL    Bilirubin Total 0.6 0.2 - 1.3 mg/dL    Albumin 4.1 3.4 - 5.0  g/dL    Protein Total 7.7 6.8 - 8.8 g/dL    Alkaline Phosphatase 70 40 - 150 U/L    ALT 30 0 - 70 U/L    AST 24 0 - 45 U/L   Albumin Random Urine Quantitative with Creat Ratio   Result Value Ref Range    Creatinine Urine 201 mg/dL    Albumin Urine mg/L 11 mg/L    Albumin Urine mg/g Cr 5.67 0 - 17 mg/g Cr   HIV Antigen Antibody Combo   Result Value Ref Range    HIV Antigen Antibody Combo Nonreactive NR^Nonreactive          Comment:      HIV-1 p24 Ag & HIV-1/HIV-2 Ab Not Detected   Hepatitis C Screen Reflex to HCV RNA Quant and Genotype   Result Value Ref Range    Hepatitis C Antibody Reactive (AA) NR^Nonreactive      Comment:      A reactive result indicates one of the following 1) current HCV infection 2)   past HCV infection that has resolved or 3) false positivity. The CDC   recommends that a reactive result should be followed by Nucleic acid testing   for HCV RNA.   If HCV RNA is detected, that indicates current HCV infection. If HCV RNA is   not detected, that indicates either past, resolved HCV infection, or false HCV   antibody positivity.  Assay performance characteristics have not been established for newborns,   infants, and children     Lipid panel reflex to direct LDL Fasting   Result Value Ref Range    Cholesterol 155 <200 mg/dL    Triglycerides 169 (H) <150 mg/dL      Comment:      Borderline high:  150-199 mg/dl  High:             200-499 mg/dl  Very high:       >499 mg/dl  Fasting specimen      HDL Cholesterol 33 (L) >39 mg/dL    LDL Cholesterol Calculated 88 <100 mg/dL      Comment:      Desirable:       <100 mg/dl    Non HDL Cholesterol 122 <130 mg/dL   Hepatitis C RNA Quantitative   Result Value Ref Range    HCV RNA Quant IU/ml HCV RNA Not Detected HCVND^HCV RNA Not Detected [IU]/mL      Comment:      The FRANCK AmpliPrep/FRANCK TaqMan HCV Test is an FDA-approved in vitro nucleic   acid amplification test for the quantitation of HCV RNA in human plasma (ETDA   plasma) or serum using the FRANCK  AmpliPrep Instrument for automated viral   nucleic acid extraction and the FRANCK TaqMan Analyzer or FRANCK TaqMan for   automated Real Time PCR amplification and detection of the viral nucleic acid   target.  Titer results are reported in International Units/mL (IU/mL) using the 1st WHO   International standard for HCV for Nucleic Acid Amplification based assays.      Log of HCV RNA Qt Not Calculated <1.2 Log IU/mL       If you have any questions or concerns, please call the clinic at the number listed above.       Sincerely,        JACKELINE Kennedy CNP

## 2018-07-19 LAB
HCV AB SERPL QL IA: REACTIVE
HIV 1+2 AB+HIV1 P24 AG SERPL QL IA: NONREACTIVE

## 2018-07-20 LAB
HCV RNA SERPL NAA+PROBE-ACNC: NORMAL [IU]/ML
HCV RNA SERPL NAA+PROBE-LOG IU: NORMAL LOG IU/ML

## 2018-11-02 VITALS — BODY MASS INDEX: 28.18 KG/M2 | HEIGHT: 70 IN | WEIGHT: 196.87 LBS

## 2019-05-02 ENCOUNTER — TELEPHONE (OUTPATIENT)
Dept: FAMILY MEDICINE | Facility: CLINIC | Age: 64
End: 2019-05-02

## 2019-05-02 NOTE — TELEPHONE ENCOUNTER
Panel Management Review      Patient has the following on his problem list:     Depression / Dysthymia review    Measure:  Needs PHQ-9 score of 4 or less during index window.  Administer PHQ-9 and if score is 5 or more, send encounter to provider for next steps.      No flowsheet data found.    If PHQ-9 recheck is 5 or more, route to provider for next steps.    Patient is due for:  PHQ9, DAP    Hypertension   Last three blood pressure readings:  BP Readings from Last 3 Encounters:   07/18/18 130/84   10/13/17 (!) 150/104   10/03/17 130/80     Blood pressure: Passed    HTN Guidelines:  Less than 140/90      Composite cancer screening  Chart review shows that this patient is due/due soon for the following None  Summary:    Patient is due/failing the following:     Health Maintenance Due   Topic Date Due     DEPRESSION ACTION PLAN  02/15/1971     PHQ-9 Q6 MONTHS  02/15/1971     ZOSTER IMMUNIZATION (1 of 2) 02/15/2003     ADVANCE DIRECTIVE PLANNING Q5 YRS  02/15/2008     MEDICARE ANNUAL WELLNESS VISIT  02/15/2018     FALL RISK ASSESSMENT  02/15/2018     PNEUMOCOCCAL IMMUNIZATION 65+ LOW/MEDIUM RISK (1 of 2 - PCV13) 02/15/2018     AORTIC ANEURYSM SCREENING (SYSTEM ASSIGNED)  02/15/2018     DTAP/TDAP/TD IMMUNIZATION (2 - Td) 03/11/2019         Action needed:   Patient needs office visit for Medicare wellness.    Type of outreach:    Sent letter.    Questions for provider review:    None                                                                                                                                    Kailee Simons CMA       Chart routed to none .

## 2019-05-02 NOTE — LETTER
May 2, 2019      Odilon Perea  [address] [facility] [address]        Dear Odilon,       Our most recent records indicate that you are due for your annual Medicare wellness visit. Please call us at [phone] to schedule as soon as you are able.      Thank you for choosing [facility] for your healthcare needs.    Sincerely,     [facility]

## 2019-06-25 ENCOUNTER — HOSPITAL ENCOUNTER (EMERGENCY)
Facility: CLINIC | Age: 64
Discharge: HOME | End: 2019-06-25

## 2020-04-09 ENCOUNTER — ADVANCED DIRECTIVES (OUTPATIENT)
Dept: HEALTH INFORMATION MANAGEMENT | Age: 65
End: 2020-04-09

## 2023-10-12 ENCOUNTER — HOSPITAL ENCOUNTER (EMERGENCY)
Facility: CLINIC | Age: 68
Discharge: HOME | End: 2023-10-12
Attending: EMERGENCY MEDICINE | Admitting: EMERGENCY MEDICINE

## 2023-10-12 VITALS
TEMPERATURE: 98.2 F | DIASTOLIC BLOOD PRESSURE: 98 MMHG | HEART RATE: 63 BPM | OXYGEN SATURATION: 97 % | RESPIRATION RATE: 18 BRPM | SYSTOLIC BLOOD PRESSURE: 180 MMHG

## 2023-10-12 DIAGNOSIS — I10 PRIMARY HYPERTENSION: ICD-10-CM

## 2023-10-12 DIAGNOSIS — R19.7 DIARRHEA, UNSPECIFIED TYPE: ICD-10-CM

## 2023-10-12 LAB
ALBUMIN SERPL BCG-MCNC: 4.1 G/DL (ref 3.5–5.2)
ALP SERPL-CCNC: 72 U/L (ref 40–129)
ALT SERPL W P-5'-P-CCNC: 35 U/L (ref 0–70)
ANION GAP SERPL CALCULATED.3IONS-SCNC: 10 MMOL/L (ref 7–15)
AST SERPL W P-5'-P-CCNC: 38 U/L (ref 0–45)
BASO+EOS+MONOS # BLD AUTO: NORMAL 10*3/UL
BASO+EOS+MONOS NFR BLD AUTO: NORMAL %
BASOPHILS # BLD AUTO: 0.1 10E3/UL (ref 0–0.2)
BASOPHILS NFR BLD AUTO: 1 %
BILIRUB SERPL-MCNC: 0.4 MG/DL
BUN SERPL-MCNC: 18.5 MG/DL (ref 8–23)
CALCIUM SERPL-MCNC: 9.6 MG/DL (ref 8.8–10.2)
CHLORIDE SERPL-SCNC: 103 MMOL/L (ref 98–107)
CREAT SERPL-MCNC: 0.95 MG/DL (ref 0.67–1.17)
DEPRECATED HCO3 PLAS-SCNC: 26 MMOL/L (ref 22–29)
EGFRCR SERPLBLD CKD-EPI 2021: 86 ML/MIN/1.73M2
EOSINOPHIL # BLD AUTO: 0.1 10E3/UL (ref 0–0.7)
EOSINOPHIL NFR BLD AUTO: 2 %
ERYTHROCYTE [DISTWIDTH] IN BLOOD BY AUTOMATED COUNT: 12.3 % (ref 10–15)
GLUCOSE SERPL-MCNC: 113 MG/DL (ref 70–99)
HCT VFR BLD AUTO: 43.4 % (ref 40–53)
HGB BLD-MCNC: 14.6 G/DL (ref 13.3–17.7)
IMM GRANULOCYTES # BLD: 0 10E3/UL
IMM GRANULOCYTES NFR BLD: 0 %
LYMPHOCYTES # BLD AUTO: 1.8 10E3/UL (ref 0.8–5.3)
LYMPHOCYTES NFR BLD AUTO: 29 %
MCH RBC QN AUTO: 31.6 PG (ref 26.5–33)
MCHC RBC AUTO-ENTMCNC: 33.6 G/DL (ref 31.5–36.5)
MCV RBC AUTO: 94 FL (ref 78–100)
MONOCYTES # BLD AUTO: 0.5 10E3/UL (ref 0–1.3)
MONOCYTES NFR BLD AUTO: 8 %
NEUTROPHILS # BLD AUTO: 3.7 10E3/UL (ref 1.6–8.3)
NEUTROPHILS NFR BLD AUTO: 60 %
NRBC # BLD AUTO: 0 10E3/UL
NRBC BLD AUTO-RTO: 0 /100
PLATELET # BLD AUTO: 228 10E3/UL (ref 150–450)
POTASSIUM SERPL-SCNC: 3.7 MMOL/L (ref 3.4–5.3)
PROT SERPL-MCNC: 7 G/DL (ref 6.4–8.3)
RBC # BLD AUTO: 4.62 10E6/UL (ref 4.4–5.9)
SODIUM SERPL-SCNC: 139 MMOL/L (ref 135–145)
WBC # BLD AUTO: 6.1 10E3/UL (ref 4–11)

## 2023-10-12 PROCEDURE — 36415 COLL VENOUS BLD VENIPUNCTURE: CPT | Performed by: EMERGENCY MEDICINE

## 2023-10-12 PROCEDURE — 80053 COMPREHEN METABOLIC PANEL: CPT | Performed by: EMERGENCY MEDICINE

## 2023-10-12 PROCEDURE — 99283 EMERGENCY DEPT VISIT LOW MDM: CPT | Performed by: EMERGENCY MEDICINE

## 2023-10-12 PROCEDURE — 85025 COMPLETE CBC W/AUTO DIFF WBC: CPT | Performed by: EMERGENCY MEDICINE

## 2023-10-12 ASSESSMENT — ACTIVITIES OF DAILY LIVING (ADL)
ADLS_ACUITY_SCORE: 35
ADLS_ACUITY_SCORE: 35

## 2023-10-12 NOTE — DISCHARGE INSTRUCTIONS
Drink plenty of fluids to stay hydrated.    Your blood pressure was elevated.  Please follow-up your primary care provider and have your blood pressure checked and you may need modification of your medication.    Return to emergency department for new or worsening symptoms.

## 2023-10-12 NOTE — ED PROVIDER NOTES
"  History     Chief Complaint   Patient presents with    Diarrhea     HPI  Odilon Perea is a 68 year old male with history notable for GERD.    -Continue home medications on omeprazole, presents for evaluation of diarrhea.  Says that yesterday he had diarrhea \"100 times\".  Mild abdominal discomfort and feels a little dizzy.  No blood or melena.  No recent antibiotic usage.  No fevers or chills.  No nausea or vomiting.  Symptoms occurred after stopping at [employer] with a chicken burrito late at night and also ate at a café at a casino.  Was drinking alcohol.  Says his sister gave him a \"pill\" and diarrhea stopped.  Unclear what this medication was.  He has had no loose stools today.    The patient's PMHx, Surgical Hx, Allergies, and Medications were all reviewed with the patient.    Allergies:  Allergies   Allergen Reactions    Nka [No Known Allergies]        Problem List:    Patient Active Problem List    Diagnosis Date Noted    Erectile dysfunction, unspecified erectile dysfunction type 07/18/2018     Priority: Medium    Decreased urine stream 07/18/2018     Priority: Medium    Reactive depression 07/18/2018     Priority: Medium    Constipation, unspecified constipation type 07/18/2018     Priority: Medium    Cervicalgia 10/13/2017     Priority: Medium    Acute bilateral low back pain without sciatica 10/13/2017     Priority: Medium    Gastroesophageal reflux disease with esophagitis 02/23/2017     Priority: Medium    Benign essential hypertension 02/23/2017     Priority: Medium    Bowel incontinence 02/23/2017     Priority: Medium    Arthritis of hip 02/23/2017     Priority: Medium    PAF (paroxysmal atrial fibrillation) (H) 02/09/2017     Priority: Medium    Benign prostatic hyperplasia 01/17/2006     Priority: Medium    Gastroesophageal reflux disease 01/17/2006     Priority: Medium        Past Medical History:    No past medical history on file.    Past Surgical History:    Past Surgical History:   Procedure " Laterality Date    ANGIOGRAM      unsure of results    COLONOSCOPY      ESOPHAGOSCOPY, GASTROSCOPY, DUODENOSCOPY (EGD), COMBINED N/A 9/29/2017    Procedure: COMBINED ESOPHAGOSCOPY, GASTROSCOPY, DUODENOSCOPY (EGD), BIOPSY SINGLE OR MULTIPLE;;  Surgeon: Fredi Leigh MD;  Location: [facility]       Family History:    Family History   Problem Relation Age of Onset    Ovarian Cancer Mother     Alzheimer Disease Father     Diabetes Brother         3 brothers with diabetes    Other - See Comments Sister         has an illness unsure what it is.        Social History:  Marital Status:  Single [1]  Social History     Tobacco Use    Smoking status: Former    Smokeless tobacco: Never   Substance Use Topics    Alcohol use: No    Drug use: No        Medications:    Acetaminophen (TYLENOL PO)  aspirin  MG EC tablet  hydrochlorothiazide (HYDRODIURIL) 25 MG tablet  HYDROcodone-acetaminophen (NORCO) 5-325 MG per tablet  lisinopril (PRINIVIL/ZESTRIL) 20 MG tablet  metoprolol succinate (TOPROL-XL) 25 MG 24 hr tablet  naproxen (NAPROSYN) 500 MG tablet  omeprazole (PRILOSEC) 20 MG CR capsule  polyethylene glycol (MIRALAX) powder          Review of Systems  Pertinent positives and negatives mentioned in HPI    Physical Exam   BP: (!) 183/109  Pulse: 70  Temp: 98.2  F (36.8  C)  Resp: 18  SpO2: 93 %    Physical Exam  GEN: Awake, alert, and cooperative. No acute distress.  Resting comfortably on cart  HENT: Oral mucosa moist  CV : Regular rate and rhythm.  No murmurs appreciated  PULM: Normal effort.  Speaking full sentences no audible wheezing ABD: Soft, non-tender, non-distended. No rebound or guarding.  Active bowel sounds  NEURO: Normal speech. Following commands. CN II-XII grossly intact. Answering questions and interacting appropriately.   EXT: No gross deformity. Warm and well perfused.  INT: Warm. No diaphoresis. Normal color.     ED Course        Procedures                 Critical Care time:  none               Results for  orders placed or performed during the hospital encounter of 10/12/23 (from the past 24 hour(s))   CBC with platelets differential    Narrative    The following orders were created for panel order CBC with platelets differential.  Procedure                               Abnormality         Status                     ---------                               -----------         ------                     CBC with platelets and d...[[id number]]                      Final result                 Please view results for these tests on the individual orders.   Comprehensive metabolic panel   Result Value Ref Range    Sodium 139 135 - 145 mmol/L    Potassium 3.7 3.4 - 5.3 mmol/L    Carbon Dioxide (CO2) 26 22 - 29 mmol/L    Anion Gap 10 7 - 15 mmol/L    Urea Nitrogen 18.5 8.0 - 23.0 mg/dL    Creatinine 0.95 0.67 - 1.17 mg/dL    GFR Estimate 86 >60 mL/min/1.73m2    Calcium 9.6 8.8 - 10.2 mg/dL    Chloride 103 98 - 107 mmol/L    Glucose 113 (H) 70 - 99 mg/dL    Alkaline Phosphatase 72 40 - 129 U/L    AST 38 0 - 45 U/L    ALT 35 0 - 70 U/L    Protein Total 7.0 6.4 - 8.3 g/dL    Albumin 4.1 3.5 - 5.2 g/dL    Bilirubin Total 0.4 <=1.2 mg/dL   CBC with platelets and differential   Result Value Ref Range    WBC Count 6.1 4.0 - 11.0 10e3/uL    RBC Count 4.62 4.40 - 5.90 10e6/uL    Hemoglobin 14.6 13.3 - 17.7 g/dL    Hematocrit 43.4 40.0 - 53.0 %    MCV 94 78 - 100 fL    MCH 31.6 26.5 - 33.0 pg    MCHC 33.6 31.5 - 36.5 g/dL    RDW 12.3 10.0 - 15.0 %    Platelet Count 228 150 - 450 10e3/uL    % Neutrophils 60 %    % Lymphocytes 29 %    % Monocytes 8 %    Mids % (Monos, Eos, Basos)      % Eosinophils 2 %    % Basophils 1 %    % Immature Granulocytes 0 %    NRBCs per 100 WBC 0 <1 /100    Absolute Neutrophils 3.7 1.6 - 8.3 10e3/uL    Absolute Lymphocytes 1.8 0.8 - 5.3 10e3/uL    Absolute Monocytes 0.5 0.0 - 1.3 10e3/uL    Mids Abs (Monos, Eos, Basos)      Absolute Eosinophils 0.1 0.0 - 0.7 10e3/uL    Absolute Basophils 0.1 0.0 - 0.2 10e3/uL     Absolute Immature Granulocytes 0.0 <=0.4 10e3/uL    Absolute NRBCs 0.0 10e3/uL       Medications - No data to display    Assessments & Plan (with Medical Decision Making)   68 year old male with 1 day of loose watery stools which is now resolved.  Looks well on exam.  Nontender abdomen with active bowel sounds.  Oral mucosa moist.  No tachycardia or hypotension.  Basic lab work reassuring.  CBC normal.  CMP grossly normal, glucose 113.  Tolerating oral fluids.  Given that his symptoms have resolved I think just needs to rehydrate.  He was hypertensive in the emergency department.  Should follow with his primary care provider for further management.  Chart review shows that he is prescribed lisinopril, metoprolol and hydrochlorothiazide.  Also notes that he is on omeprazole which would make him more susceptible to acute enteritis. No need for loperamide since loose stools have stopped.    Patient is taking hydrochlorothiazide that he gets from [place] and is not on lisinopril or metformin.  Discussed that his blood pressure is too high and this is going to cause problems.  We specifically discussed cardiovascular risks.  A primary care referral was placed.         I have reviewed the nursing notes.         New Prescriptions    No medications on file       Final diagnoses:   Diarrhea, unspecified type   Primary hypertension     Refugio Matthews MD        10/12/2023   [facility] EMERGENCY DEPT    Disclaimer: This note consists of words and symbols derived from keyboarding and dictation using voice recognition software.  As a result, there may be errors that have gone undetected.  Please consider this when interpreting information found in this note.               Refugio Matthews MD  10/12/23 8817

## 2023-10-12 NOTE — ED TRIAGE NOTES
"Pt here with diarrhea started yesterday after stopping at [employer] had a chicken barrito late at night also states he ate [place]  No vomiting or fever. Pt says \"I have this disorder every time I eat I get drunk, my stomach makes alcohol, I know this cause I get dizzy after I eat\" Pt states that he takes hydrochlorothiazide from [place] and omeprazole.      Triage Assessment (Adult)       Row Name 10/12/23 0902          Triage Assessment    Airway WDL WDL        Respiratory WDL    Respiratory WDL WDL        Skin Circulation/Temperature WDL    Skin Circulation/Temperature WDL WDL        Cardiac WDL    Cardiac WDL WDL        Peripheral/Neurovascular WDL    Peripheral Neurovascular WDL WDL        Cognitive/Neuro/Behavioral WDL    Cognitive/Neuro/Behavioral WDL WDL                     "